# Patient Record
Sex: FEMALE | Race: WHITE | Employment: OTHER | URBAN - METROPOLITAN AREA
[De-identification: names, ages, dates, MRNs, and addresses within clinical notes are randomized per-mention and may not be internally consistent; named-entity substitution may affect disease eponyms.]

---

## 2021-06-25 ENCOUNTER — HOSPITAL ENCOUNTER (OUTPATIENT)
Age: 86
Setting detail: SPECIMEN
Discharge: HOME | End: 2021-06-25
Payer: MEDICARE

## 2021-06-25 LAB
ABSOLUTE EOS #: 0.06 K/UL (ref 0–0.44)
ABSOLUTE IMMATURE GRANULOCYTE: <0.03 K/UL (ref 0–0.3)
ABSOLUTE LYMPH #: 1.26 K/UL (ref 1.1–3.7)
ABSOLUTE MONO #: 0.39 K/UL (ref 0.1–1.2)
ANION GAP SERPL CALCULATED.3IONS-SCNC: 15 MMOL/L (ref 9–17)
BASOPHILS # BLD: 1 % (ref 0–2)
BASOPHILS ABSOLUTE: 0.04 K/UL (ref 0–0.2)
BUN BLDV-MCNC: 21 MG/DL (ref 8–23)
BUN/CREAT BLD: NORMAL (ref 9–20)
CALCIUM SERPL-MCNC: 10.1 MG/DL (ref 8.6–10.4)
CHLORIDE BLD-SCNC: 106 MMOL/L (ref 98–107)
CO2: 23 MMOL/L (ref 20–31)
CREAT SERPL-MCNC: 0.64 MG/DL (ref 0.5–0.9)
DIFFERENTIAL TYPE: ABNORMAL
EOSINOPHILS RELATIVE PERCENT: 1 % (ref 1–4)
GFR AFRICAN AMERICAN: >60 ML/MIN
GFR NON-AFRICAN AMERICAN: >60 ML/MIN
GFR SERPL CREATININE-BSD FRML MDRD: NORMAL ML/MIN/{1.73_M2}
GFR SERPL CREATININE-BSD FRML MDRD: NORMAL ML/MIN/{1.73_M2}
GLUCOSE BLD-MCNC: 83 MG/DL (ref 70–99)
HCT VFR BLD CALC: 41.6 % (ref 36.3–47.1)
HEMOGLOBIN: 12.8 G/DL (ref 11.9–15.1)
IMMATURE GRANULOCYTES: 0 %
LYMPHOCYTES # BLD: 19 % (ref 24–43)
MCH RBC QN AUTO: 30.7 PG (ref 25.2–33.5)
MCHC RBC AUTO-ENTMCNC: 30.8 G/DL (ref 28.4–34.8)
MCV RBC AUTO: 99.8 FL (ref 82.6–102.9)
MONOCYTES # BLD: 6 % (ref 3–12)
NRBC AUTOMATED: 0 PER 100 WBC
PDW BLD-RTO: 13.3 % (ref 11.8–14.4)
PHOSPHORUS: 3 MG/DL (ref 2.6–4.5)
PLATELET # BLD: 263 K/UL (ref 138–453)
PLATELET ESTIMATE: ABNORMAL
PMV BLD AUTO: 10.1 FL (ref 8.1–13.5)
POTASSIUM SERPL-SCNC: 4.2 MMOL/L (ref 3.7–5.3)
PTH INTACT: 97.42 PG/ML (ref 15–65)
RBC # BLD: 4.17 M/UL (ref 3.95–5.11)
RBC # BLD: ABNORMAL 10*6/UL
SEG NEUTROPHILS: 73 % (ref 36–65)
SEGMENTED NEUTROPHILS ABSOLUTE COUNT: 4.79 K/UL (ref 1.5–8.1)
SODIUM BLD-SCNC: 144 MMOL/L (ref 135–144)
WBC # BLD: 6.6 K/UL (ref 3.5–11.3)
WBC # BLD: ABNORMAL 10*3/UL

## 2021-06-26 LAB
ESTIMATED AVERAGE GLUCOSE: 105 MG/DL
HBA1C MFR BLD: 5.3 % (ref 4–6)
VITAMIN D 25-HYDROXY: 52.4 NG/ML (ref 30–100)

## 2022-02-01 ENCOUNTER — HOSPITAL ENCOUNTER (OUTPATIENT)
Age: 87
Setting detail: SPECIMEN
Discharge: HOME | End: 2022-02-01

## 2022-02-01 LAB
ALBUMIN SERPL-MCNC: 4.3 G/DL (ref 3.5–5.2)
ALBUMIN/GLOBULIN RATIO: 1.9 (ref 1–2.5)
ALP BLD-CCNC: 91 U/L (ref 35–104)
ALT SERPL-CCNC: 13 U/L (ref 5–33)
ANION GAP SERPL CALCULATED.3IONS-SCNC: 12 MMOL/L (ref 9–17)
AST SERPL-CCNC: 17 U/L
BILIRUB SERPL-MCNC: 0.44 MG/DL (ref 0.3–1.2)
BUN BLDV-MCNC: 19 MG/DL (ref 8–23)
BUN/CREAT BLD: ABNORMAL (ref 9–20)
CALCIUM SERPL-MCNC: 10.4 MG/DL (ref 8.6–10.4)
CHLORIDE BLD-SCNC: 109 MMOL/L (ref 98–107)
CO2: 27 MMOL/L (ref 20–31)
CREAT SERPL-MCNC: 0.67 MG/DL (ref 0.5–0.9)
GFR AFRICAN AMERICAN: >60 ML/MIN
GFR NON-AFRICAN AMERICAN: >60 ML/MIN
GFR SERPL CREATININE-BSD FRML MDRD: ABNORMAL ML/MIN/{1.73_M2}
GFR SERPL CREATININE-BSD FRML MDRD: ABNORMAL ML/MIN/{1.73_M2}
GLUCOSE FASTING: 76 MG/DL (ref 70–99)
PHOSPHORUS: 3.1 MG/DL (ref 2.6–4.5)
POTASSIUM SERPL-SCNC: 4 MMOL/L (ref 3.7–5.3)
PTH INTACT: 84.81 PG/ML (ref 15–65)
SODIUM BLD-SCNC: 148 MMOL/L (ref 135–144)
TOTAL PROTEIN: 6.6 G/DL (ref 6.4–8.3)

## 2023-05-18 ENCOUNTER — APPOINTMENT (OUTPATIENT)
Dept: CT IMAGING | Age: 88
End: 2023-05-18
Payer: MEDICARE

## 2023-05-18 ENCOUNTER — ANCILLARY PROCEDURE (OUTPATIENT)
Dept: EMERGENCY DEPT | Age: 88
End: 2023-05-18
Payer: MEDICARE

## 2023-05-18 ENCOUNTER — HOSPITAL ENCOUNTER (EMERGENCY)
Age: 88
Discharge: HOME | End: 2023-05-19
Attending: EMERGENCY MEDICINE
Payer: MEDICARE

## 2023-05-18 DIAGNOSIS — R22.1 NECK SWELLING: ICD-10-CM

## 2023-05-18 DIAGNOSIS — N30.00 ACUTE CYSTITIS WITHOUT HEMATURIA: Primary | ICD-10-CM

## 2023-05-18 LAB
ANION GAP SERPL CALCULATED.3IONS-SCNC: 8 MMOL/L (ref 9–17)
BASOPHILS # BLD: 0.04 K/UL (ref 0–0.2)
BASOPHILS NFR BLD: 1 % (ref 0–2)
BUN SERPL-MCNC: 23 MG/DL (ref 8–23)
CALCIUM SERPL-MCNC: 10.4 MG/DL (ref 8.6–10.4)
CHLORIDE SERPL-SCNC: 108 MMOL/L (ref 98–107)
CO2 SERPL-SCNC: 29 MMOL/L (ref 20–31)
CREAT SERPL-MCNC: 0.96 MG/DL (ref 0.5–0.9)
EOSINOPHIL # BLD: 0.11 K/UL (ref 0–0.44)
EOSINOPHILS RELATIVE PERCENT: 1 % (ref 1–4)
ERYTHROCYTE [DISTWIDTH] IN BLOOD BY AUTOMATED COUNT: 13.1 % (ref 11.8–14.4)
GFR SERPL CREATININE-BSD FRML MDRD: 57 ML/MIN/1.73M2
GLUCOSE SERPL-MCNC: 108 MG/DL (ref 70–99)
HCT VFR BLD AUTO: 39.9 % (ref 36.3–47.1)
HGB BLD-MCNC: 12.9 G/DL (ref 11.9–15.1)
IMM GRANULOCYTES # BLD AUTO: 0.03 K/UL (ref 0–0.3)
IMM GRANULOCYTES NFR BLD: 0 %
INR PPP: 1
LYMPHOCYTES # BLD: 21 % (ref 24–43)
LYMPHOCYTES NFR BLD: 1.68 K/UL (ref 1.1–3.7)
MCH RBC QN AUTO: 31.2 PG (ref 25.2–33.5)
MCHC RBC AUTO-ENTMCNC: 32.3 G/DL (ref 28.4–34.8)
MCV RBC AUTO: 96.6 FL (ref 82.6–102.9)
MONOCYTES NFR BLD: 0.72 K/UL (ref 0.1–1.2)
MONOCYTES NFR BLD: 9 % (ref 3–12)
NEUTROPHILS NFR BLD: 68 % (ref 36–65)
NEUTS SEG NFR BLD: 5.53 K/UL (ref 1.5–8.1)
NRBC AUTOMATED: 0 PER 100 WBC
PARTIAL THROMBOPLASTIN TIME: 25.6 SEC (ref 23–36.5)
PLATELET # BLD AUTO: 269 K/UL (ref 138–453)
PMV BLD AUTO: 9.6 FL (ref 8.1–13.5)
POTASSIUM SERPL-SCNC: 4.2 MMOL/L (ref 3.7–5.3)
PROTHROMBIN TIME: 12.8 SEC (ref 11.7–14.9)
RBC # BLD AUTO: 4.13 M/UL (ref 3.95–5.11)
SODIUM SERPL-SCNC: 145 MMOL/L (ref 135–144)
WBC OTHER # BLD: 8.1 K/UL (ref 3.5–11.3)

## 2023-05-18 PROCEDURE — 85025 COMPLETE CBC W/AUTO DIFF WBC: CPT

## 2023-05-18 PROCEDURE — 76882 US LMTD JT/FCL EVL NVASC XTR: CPT

## 2023-05-18 PROCEDURE — 85610 PROTHROMBIN TIME: CPT

## 2023-05-18 PROCEDURE — 70498 CT ANGIOGRAPHY NECK: CPT

## 2023-05-18 PROCEDURE — 99285 EMERGENCY DEPT VISIT HI MDM: CPT

## 2023-05-18 PROCEDURE — 85730 THROMBOPLASTIN TIME PARTIAL: CPT

## 2023-05-18 PROCEDURE — 6360000004 HC RX CONTRAST MEDICATION: Performed by: STUDENT IN AN ORGANIZED HEALTH CARE EDUCATION/TRAINING PROGRAM

## 2023-05-18 PROCEDURE — 70450 CT HEAD/BRAIN W/O DYE: CPT

## 2023-05-18 PROCEDURE — 80048 BASIC METABOLIC PNL TOTAL CA: CPT

## 2023-05-18 RX ORDER — OXYBUTYNIN CHLORIDE 5 MG/1
1 TABLET, EXTENDED RELEASE ORAL DAILY
COMMUNITY
Start: 2021-12-14

## 2023-05-18 RX ORDER — RAMIPRIL 5 MG/1
1 CAPSULE ORAL DAILY
COMMUNITY
Start: 2020-07-03

## 2023-05-18 RX ORDER — DONEPEZIL HYDROCHLORIDE 10 MG/1
10 TABLET, FILM COATED ORAL DAILY
COMMUNITY
Start: 2021-12-14

## 2023-05-18 RX ADMIN — IOPAMIDOL 90 ML: 755 INJECTION, SOLUTION INTRAVENOUS at 23:45

## 2023-05-18 RX ADMIN — IOPAMIDOL 75 ML: 755 INJECTION, SOLUTION INTRAVENOUS at 23:54

## 2023-05-19 VITALS
DIASTOLIC BLOOD PRESSURE: 92 MMHG | HEART RATE: 77 BPM | OXYGEN SATURATION: 99 % | RESPIRATION RATE: 14 BRPM | TEMPERATURE: 98.2 F | WEIGHT: 125 LBS | HEIGHT: 58 IN | SYSTOLIC BLOOD PRESSURE: 167 MMHG | BODY MASS INDEX: 26.24 KG/M2

## 2023-05-19 LAB
BILIRUB UR QL STRIP: NEGATIVE
CASTS #/AREA URNS LPF: NORMAL /LPF (ref 0–8)
CLARITY UR: CLEAR
COLOR UR: YELLOW
EPI CELLS #/AREA URNS HPF: NORMAL /HPF (ref 0–5)
GLUCOSE UR STRIP-MCNC: NEGATIVE MG/DL
HGB UR QL STRIP.AUTO: ABNORMAL
KETONES UR STRIP-MCNC: NEGATIVE MG/DL
LEUKOCYTE ESTERASE UR QL STRIP: ABNORMAL
NITRITE UR QL STRIP: NEGATIVE
PH UR STRIP: 7.5 [PH] (ref 5–8)
PROT UR STRIP-MCNC: ABNORMAL MG/DL
RBC #/AREA URNS HPF: NORMAL /HPF (ref 0–4)
SP GR UR STRIP: 1.06 (ref 1–1.03)
UROBILINOGEN UR STRIP-ACNC: NORMAL
WBC #/AREA URNS HPF: NORMAL /HPF (ref 0–5)

## 2023-05-19 PROCEDURE — 81001 URINALYSIS AUTO W/SCOPE: CPT

## 2023-05-19 RX ORDER — CEPHALEXIN 500 MG/1
500 CAPSULE ORAL 2 TIMES DAILY
Qty: 14 CAPSULE | Refills: 0 | Status: SHIPPED | OUTPATIENT
Start: 2023-05-19 | End: 2023-05-19 | Stop reason: SDUPTHER

## 2023-05-19 RX ORDER — CEPHALEXIN 500 MG/1
500 CAPSULE ORAL 2 TIMES DAILY
Qty: 14 CAPSULE | Refills: 0 | Status: SHIPPED | OUTPATIENT
Start: 2023-05-19 | End: 2023-05-26

## 2023-05-19 ASSESSMENT — ENCOUNTER SYMPTOMS
TROUBLE SWALLOWING: 0
VOICE CHANGE: 0
ABDOMINAL PAIN: 0
VOMITING: 0
SHORTNESS OF BREATH: 0
COLOR CHANGE: 0
NAUSEA: 0

## 2023-05-19 NOTE — ED NOTES
Pt brought to ED by her children with c/o R sided neck swelling  Pt states that she has a buldge in her neck that is pulsating  Pts daughters state that it started tonight  Pt states she has no pain, or headache  Pt denies having any chest pain or SOB  Pt resting in bed, call light given to patient white board updated      Sarah Boucher RN  05/18/23 1095

## 2023-05-19 NOTE — ED NOTES
The following labs were labeled with appropriate pt sticker and tubed to lab:     [] Blue     [] Lavender   [] on ice  [] Green/yellow  [] Green/black [] on ice  [] Pushpa Geoff  [] on ice  [] Yellow  [] Red  [] Type/ Screen  [] ABG  [] VBG    [] COVID-19 swab    [] Rapid  [] PCR  [] Flu swab  [] Peds Viral Panel     [x] Urine Sample  [] Fecal Sample  [] Pelvic Cultures  [] Blood Cultures  [] X 2  [] STREP Cultures      Juan Antonio Metz, JATINDER  05/19/23 0141

## 2023-05-19 NOTE — DISCHARGE INSTRUCTIONS
Follow up with your primary care provider in the next 1-2 days. Your CT scans of your neck showed no dilation of the arteries, but you do have calcifications in the arteries. We discussed with vascular surgery who state this is a normal finding and does not require additional follow up. Your urine shows a mild urinary tract infection. Take your medication as indicated and prescribed. Make sure you get the prescription filled and take the antibiotics until finished. Drink plenty of water while taking the antibiotics. Avoid drinking alcohol or drinks that have caffeine in it while taking antibiotics. For pain use acetaminophen (Tylenol) or ibuprofen (Motrin / Advil), unless prescribed medications that have acetaminophen or ibuprofen (or similar medications) in it. You can take over the counter acetaminophen tablets (1 - 2 tablets of the 500-mg strength every 6 hours) or ibuprofen tablets (2 tablets every 4 hours). PLEASE RETURN TO THE EMERGENCY DEPARTMENT IMMEDIATELY for worsening symptoms, inability to urinate, worsening of blood in your urine, or if you develop any concerning symptoms such as: high fever not relieved by acetaminophen (Tylenol) and/or ibuprofen (Motrin / Advil), chills, shortness of breath, chest pain, feeling of your heart fluttering or racing, persistent nausea and/or vomiting, vomiting up blood, blood in your stool, loss of consciousness, numbness, weakness or tingling in the arms or legs or change in color of the extremities, changes in mental status, persistent headache, blurry vision, loss of bladder / bowel. DISCHARGE

## 2023-05-19 NOTE — ED NOTES
Dr. Laurie Disla @ bedside with portable US to evaluate patient      Toñito Ebcarlene, RN  05/19/23 0021

## 2023-05-19 NOTE — ED NOTES
The following labs were labeled with appropriate pt sticker and tubed to lab:     [x] Blue     [x] Lavender   [] on ice  [x] Green/yellow  [] Green/black [] on ice  [] Andreina Blood  [] on ice  [] Yellow  [] Red  [] Type/ Screen  [] ABG  [] VBG    [] COVID-19 swab    [] Rapid  [] PCR  [] Flu swab  [] Peds Viral Panel     [] Urine Sample  [] Fecal Sample  [] Pelvic Cultures  [] Blood Cultures  [] X 2  [] STREP Cultures       Ramos Oden RN  05/18/23 7071

## 2023-05-19 NOTE — ED NOTES
Pts daughter concerned that patients urine has a strong odor to it, they are requesting a UA    Spoke with Dr Mitali Vogel about concerns, order placed.     Pt ambulated to restroom with assistance of daughters      Sarah Boucher, [facility]  05/19/23 7193

## 2023-05-19 NOTE — ED PROVIDER NOTES
[facility]     Emergency Department     Faculty Note/ Attestation      Pt Name: Lizz Rodriguez                                       MRN: [id number]  Kaye 1934  Date of evaluation: 5/18/2023  Note Started: 11:30 PM EDT    Patients PCP:    Raphael Spivey MD    Attestation  I performed a history and physical examination of the patient and discussed management with the resident. I reviewed the residents note and agree with the documented findings and plan of care. Any areas of disagreement are noted on the chart. I was personally present for the key portions of any procedures. I have documented in the chart those procedures where I was not present during the key portions. I have reviewed the emergency nurses triage note. I agree with the chief complaint, past medical history, past surgical history, allergies, medications, social and family history as documented unless otherwise noted below. For Physician Assistant/ Nurse Practitioner cases/documentation I have personally evaluated this patient and have completed at least one if not all key elements of the E/M (history, physical exam, and MDM). Additional findings are as noted. Initial Screens:        Arlene Coma Scale  Eye Opening: Spontaneous  Best Verbal Response: Oriented  Best Motor Response: Obeys commands  Arlene Coma Scale Score: 15    Vitals:    Vitals:    05/18/23 2248 05/18/23 2257 05/18/23 2300 05/18/23 2309   BP:   (!) 155/80    Pulse:    77   Resp:    14   Temp:       TempSrc:       SpO2: 95%  98%    Weight:  125 lb (56.7 kg)     Height:  4' 10\" (1.473 m)         CHIEF COMPLAINT       Chief Complaint   Patient presents with    Neck Swelling     Pt c/o having r sided neck swelling that is pulsating that started tonight      Patient is an 80-year-old female slightly demented lives with family who is here in providing history. Patient noted some neck swelling developed today with a pulsation in it.
Faculty Sign-Out Attestation  Handoff taken on the following patient from prior Attending Physician: Creta Buerger    I was available and discussed any additional care issues that arose and coordinated the management plans with the resident(s) caring for the patient during my duty period. Any areas of disagreement with residents documentation of care or procedures are noted on the chart. I was personally present for the key portions of any/all procedures during my duty period. I have documented in the chart those procedures where I was not present during the key portions.     Neck complaint, ct / cta pending,   Probable vascular consult,     Ct stable, vascular cleared pt for discharge home,   Pt talkative / ambulatory, vss, treating cystitis,   discharged     Cindi Conrad, DO  05/19/23 [address] [facility], DO  05/19/23 0107
[address] [facility]  Emergency Department  Emergency Medicine Resident Turn-Over     Note Started: 12:20 AM EDT    Care of [facility] was assumed from Dr. Carl Ames and is being seen for Neck Swelling (Pt c/o having r sided neck swelling that is pulsating that started tonight )  . The patient's initial evaluation and plan have been discussed with the prior provider who initially evaluated the patient.      EMERGENCY DEPARTMENT COURSE / MEDICAL DECISION MAKING:       MEDICATIONS GIVEN:  Orders Placed This Encounter   Medications    iopamidol (ISOVUE-370) 76 % injection 90 mL    iopamidol (ISOVUE-370) 76 % injection 75 mL    DISCONTD: cephALEXin (KEFLEX) 500 MG capsule     Sig: Take 1 capsule by mouth 2 times daily for 7 days     Dispense:  14 capsule     Refill:  0    cephALEXin (KEFLEX) 500 MG capsule     Sig: Take 1 capsule by mouth 2 times daily for 7 days     Dispense:  14 capsule     Refill:  0       LABS / RADIOLOGY:     Labs Reviewed   CBC WITH AUTO DIFFERENTIAL - Abnormal; Notable for the following components:       Result Value    Seg Neutrophils 68 (*)     Lymphocytes 21 (*)     All other components within normal limits   BASIC METABOLIC PANEL - Abnormal; Notable for the following components:    Glucose 108 (*)     Creatinine 0.96 (*)     Est, Glom Filt Rate 57 (*)     Sodium 145 (*)     Chloride 108 (*)     Anion Gap 8 (*)     All other components within normal limits   URINALYSIS WITH REFLEX TO CULTURE - Abnormal; Notable for the following components:    Specific Gravity, UA 1.057 (*)     Urine Hgb TRACE (*)     Protein, UA NEGATIVE  Verified by sulfosalicylic acid test. (*)     Leukocyte Esterase, Urine TRACE (*)     All other components within normal limits   APTT   PROTIME-INR   MICROSCOPIC URINALYSIS       POC US EXTREMITY NON VASC LIMITED    Result Date: 5/18/2023  POCUS_Soft_Tissue     Exam Information:          Exam type:  Clinically indicated     Indication(s) for Exam:
region, ultrasound demonstrates a larger carotid artery on the right compared to the left  Cardiovascular:      Rate and Rhythm: Normal rate and regular rhythm. Pulses: Normal pulses. Pulmonary:      Effort: Pulmonary effort is normal. No respiratory distress. Breath sounds: Normal breath sounds. No wheezing. Abdominal:      General: Abdomen is flat. Palpations: Abdomen is soft. Tenderness: There is no abdominal tenderness. There is no guarding or rebound. Skin:     General: Skin is warm and dry. Neurological:      Mental Status: She is alert. Comments: Cranial nerves II-XII are equal and grossly intact with pupils equal and reactive with bilateral pupillary reflexes intact, no visual field deficits, intact extraocular motion, no facial motor deficit or droop, no facial sensory deficit, symmetrical palate elevation, intact tongue range of motion, good shoulder shrug and good neck range of motion,  no pronator drift, good hand grasp bilaterally, 5/5 strength in the bilateral upper and lower extremity, no sensory deficit in the upper or lower extremity. DDX/DIAGNOSTIC RESULTS / EMERGENCY DEPARTMENT COURSE / MDM     Medical Decision Making  80-year-old female presents with a mass to her right neck. Denies recent trauma or injury and no neurological deficits. On exam she is well-appearing nontoxic vitals are within normal's. Examination unremarkable. No neurological deficits. Ultrasound demonstrates concern for larger carotid artery on the right versus the left concerning for aneurysm or possibly dissection. Will obtain CT head and CT angiogram, CBC BMP PTT and INR. Amount and/or Complexity of Data Reviewed  Labs: ordered. Radiology: ordered. Risk  Prescription drug management.           EMERGENCY DEPARTMENT COURSE:    Signed out to oncoming resident awaiting CT and CTA results    ED Course as of 05/21/23 1959   Fri May 19, 2023   0020 Signout received from

## 2023-05-19 NOTE — CONSULTS
Division of Vascular Surgery        New Consult      Physician Requesting Consult:  Dr. José Miguel Sher    Reason for Consult:  Pulsatile neck mass    Chief Complaint:      Pulsating in R side of neck    History of Present Illness:      Awilda Narvaez is a 80 y.o. woman who presents with concern regarding pulsatile mass that she noticed in the right side of her neck. Pt is accompanied by her two daughters. Pt states that she first noticed the pulsing sensation when she rubbed her neck after gardening yesterday afternoon. Pt denies any other associated symptoms. Pt denies any recent trauma to the area. Denies pain, lightheadness, weakness, difficulty speaking, numbness, tingling, facial droop, fever, chills, nausea, and vomiting. Pt's daughters encouraged her to seek further evaluation after noting visual pulsation in pt's right neck. Pt has medical history of hypertension, dementia,  and neurogenic bladder. Pt and daughters deny any prior vascular history. CTA of the head and neck was performed upon presentation which revealed  a tortuous superficial course of innominate artery, kinking of the left internal carotid artery causing 50% stenosis, and no evidence of dissection or aneurysm. Medical History:     History reviewed. No pertinent past medical history. Surgical History:     History reviewed. No pertinent surgical history. Family History:     History reviewed. No pertinent family history. Allergies:       Patient has no known allergies. Medications:      No current facility-administered medications for this encounter.      Current Outpatient Medications   Medication Sig Dispense Refill    ramipril (ALTACE) 5 MG capsule Take 1 capsule by mouth daily      oxybutynin (DITROPAN-XL) 5 MG extended release tablet Take 1 tablet by mouth daily      donepezil (ARICEPT) 10 MG tablet Take 1 tablet by mouth daily      Cholecalciferol 50 MCG (2000 UT) CAPS Vitamin D3 50 mcg (2,000 unit) capsule   Take by oral Please call.  We will send in pantoprazole instead of the omeprazole and see if that helps better.  Be sure she takes on an empty stomach at least 30 minutes before eating.  I will send this to [facility] pharmacy.  Will just try 30 day supply first to see if helps.

## 2023-11-23 ENCOUNTER — ANCILLARY PROCEDURE (OUTPATIENT)
Dept: EMERGENCY DEPT | Age: 88
End: 2023-11-23
Attending: EMERGENCY MEDICINE
Payer: MEDICARE

## 2023-11-23 ENCOUNTER — APPOINTMENT (OUTPATIENT)
Dept: GENERAL RADIOLOGY | Age: 88
End: 2023-11-23
Payer: MEDICARE

## 2023-11-23 ENCOUNTER — APPOINTMENT (OUTPATIENT)
Dept: CT IMAGING | Age: 88
End: 2023-11-23
Payer: MEDICARE

## 2023-11-23 ENCOUNTER — HOSPITAL ENCOUNTER (INPATIENT)
Age: 88
LOS: 1 days | Discharge: HOME | End: 2023-11-25
Attending: EMERGENCY MEDICINE | Admitting: INTERNAL MEDICINE
Payer: MEDICARE

## 2023-11-23 DIAGNOSIS — R55 SYNCOPE, UNSPECIFIED SYNCOPE TYPE: Primary | ICD-10-CM

## 2023-11-23 LAB
ALBUMIN SERPL-MCNC: 3.8 G/DL (ref 3.5–5.2)
ALBUMIN/GLOB SERPL: 1.5 {RATIO} (ref 1–2.5)
ALP SERPL-CCNC: 94 U/L (ref 35–104)
ALT SERPL-CCNC: 9 U/L (ref 5–33)
ANION GAP SERPL CALCULATED.3IONS-SCNC: 11 MMOL/L (ref 9–17)
AST SERPL-CCNC: 19 U/L
BASOPHILS # BLD: 0.03 K/UL (ref 0–0.2)
BASOPHILS NFR BLD: 1 % (ref 0–2)
BILIRUB SERPL-MCNC: 0.3 MG/DL (ref 0.3–1.2)
BILIRUB UR QL STRIP: NEGATIVE
BNP SERPL-MCNC: 221 PG/ML
BUN BLD-MCNC: 18 MG/DL (ref 8–26)
BUN SERPL-MCNC: 16 MG/DL (ref 8–23)
CA-I BLD-SCNC: 1.36 MMOL/L (ref 1.15–1.33)
CALCIUM SERPL-MCNC: 10.1 MG/DL (ref 8.6–10.4)
CASTS #/AREA URNS LPF: ABNORMAL /LPF (ref 0–2)
CASTS #/AREA URNS LPF: ABNORMAL /LPF (ref 0–2)
CHLORIDE BLD-SCNC: 105 MMOL/L (ref 98–107)
CHLORIDE SERPL-SCNC: 105 MMOL/L (ref 98–107)
CLARITY UR: ABNORMAL
CO2 BLD CALC-SCNC: 32 MMOL/L (ref 22–30)
CO2 SERPL-SCNC: 28 MMOL/L (ref 20–31)
COLOR UR: YELLOW
CREAT SERPL-MCNC: 1.1 MG/DL (ref 0.5–0.9)
EGFR, POC: >60 ML/MIN/1.73M2
EOSINOPHIL # BLD: 0.08 K/UL (ref 0–0.44)
EOSINOPHILS RELATIVE PERCENT: 1 % (ref 1–4)
EPI CELLS #/AREA URNS HPF: ABNORMAL /HPF (ref 0–5)
ERYTHROCYTE [DISTWIDTH] IN BLOOD BY AUTOMATED COUNT: 13.2 % (ref 11.8–14.4)
GFR SERPL CREATININE-BSD FRML MDRD: 48 ML/MIN/1.73M2
GLUCOSE BLD-MCNC: 148 MG/DL (ref 74–100)
GLUCOSE SERPL-MCNC: 144 MG/DL (ref 70–99)
GLUCOSE UR STRIP-MCNC: NEGATIVE MG/DL
HCO3 VENOUS: 31.5 MMOL/L (ref 22–29)
HCT VFR BLD AUTO: 38 % (ref 36–46)
HCT VFR BLD AUTO: 40.5 % (ref 36.3–47.1)
HGB BLD-MCNC: 13.2 G/DL (ref 11.9–15.1)
HGB UR QL STRIP.AUTO: ABNORMAL
IMM GRANULOCYTES # BLD AUTO: 0.03 K/UL (ref 0–0.3)
IMM GRANULOCYTES NFR BLD: 1 %
KETONES UR STRIP-MCNC: NEGATIVE MG/DL
LEUKOCYTE ESTERASE UR QL STRIP: ABNORMAL
LIPASE SERPL-CCNC: 39 U/L (ref 13–60)
LYMPHOCYTES NFR BLD: 1.19 K/UL (ref 1.1–3.7)
LYMPHOCYTES RELATIVE PERCENT: 20 % (ref 24–43)
MCH RBC QN AUTO: 31.6 PG (ref 25.2–33.5)
MCHC RBC AUTO-ENTMCNC: 32.6 G/DL (ref 28.4–34.8)
MCV RBC AUTO: 96.9 FL (ref 82.6–102.9)
MONOCYTES NFR BLD: 0.41 K/UL (ref 0.1–1.2)
MONOCYTES NFR BLD: 7 % (ref 3–12)
NEUTROPHILS NFR BLD: 70 % (ref 36–65)
NEUTS SEG NFR BLD: 4.16 K/UL (ref 1.5–8.1)
NITRITE UR QL STRIP: NEGATIVE
NRBC BLD-RTO: 0 PER 100 WBC
O2 SAT, VEN: 17.7 % (ref 60–85)
PCO2, VEN: 53 MM HG (ref 41–51)
PH UR STRIP: 8 [PH] (ref 5–8)
PH VENOUS: 7.38 (ref 7.32–7.43)
PLATELET # BLD AUTO: 225 K/UL (ref 138–453)
PMV BLD AUTO: 9.5 FL (ref 8.1–13.5)
PO2, VEN: 14.8 MM HG (ref 30–50)
POC ANION GAP: 8 MMOL/L (ref 7–16)
POC CREATININE: 0.8 MG/DL (ref 0.51–1.19)
POC HEMOGLOBIN (CALC): 12.8 G/DL (ref 12–16)
POC LACTIC ACID: 1.2 MMOL/L (ref 0.56–1.39)
POSITIVE BASE EXCESS, VEN: 5.1 MMOL/L (ref 0–3)
POTASSIUM BLD-SCNC: 4 MMOL/L (ref 3.5–4.5)
POTASSIUM SERPL-SCNC: 4.1 MMOL/L (ref 3.7–5.3)
PROT SERPL-MCNC: 6.4 G/DL (ref 6.4–8.3)
PROT UR STRIP-MCNC: NEGATIVE MG/DL
RBC # BLD AUTO: 4.18 M/UL (ref 3.95–5.11)
RBC #/AREA URNS HPF: ABNORMAL /HPF (ref 0–2)
SODIUM BLD-SCNC: 144 MMOL/L (ref 138–146)
SODIUM SERPL-SCNC: 144 MMOL/L (ref 135–144)
SP GR UR STRIP: 1.04 (ref 1–1.03)
TROPONIN I SERPL HS-MCNC: 9 NG/L (ref 0–14)
TROPONIN I SERPL HS-MCNC: 9 NG/L (ref 0–14)
UROBILINOGEN UR STRIP-ACNC: NORMAL EU/DL (ref 0–1)
WBC #/AREA URNS HPF: ABNORMAL /HPF (ref 0–5)
WBC OTHER # BLD: 5.9 K/UL (ref 3.5–11.3)

## 2023-11-23 PROCEDURE — 83605 ASSAY OF LACTIC ACID: CPT

## 2023-11-23 PROCEDURE — 84520 ASSAY OF UREA NITROGEN: CPT

## 2023-11-23 PROCEDURE — 71045 X-RAY EXAM CHEST 1 VIEW: CPT

## 2023-11-23 PROCEDURE — 80051 ELECTROLYTE PANEL: CPT

## 2023-11-23 PROCEDURE — 2580000003 HC RX 258: Performed by: STUDENT IN AN ORGANIZED HEALTH CARE EDUCATION/TRAINING PROGRAM

## 2023-11-23 PROCEDURE — 82565 ASSAY OF CREATININE: CPT

## 2023-11-23 PROCEDURE — 83880 ASSAY OF NATRIURETIC PEPTIDE: CPT

## 2023-11-23 PROCEDURE — 85025 COMPLETE CBC W/AUTO DIFF WBC: CPT

## 2023-11-23 PROCEDURE — 99285 EMERGENCY DEPT VISIT HI MDM: CPT

## 2023-11-23 PROCEDURE — 84484 ASSAY OF TROPONIN QUANT: CPT

## 2023-11-23 PROCEDURE — 93976 VASCULAR STUDY: CPT

## 2023-11-23 PROCEDURE — 81001 URINALYSIS AUTO W/SCOPE: CPT

## 2023-11-23 PROCEDURE — 70450 CT HEAD/BRAIN W/O DYE: CPT

## 2023-11-23 PROCEDURE — 85014 HEMATOCRIT: CPT

## 2023-11-23 PROCEDURE — 93005 ELECTROCARDIOGRAM TRACING: CPT | Performed by: STUDENT IN AN ORGANIZED HEALTH CARE EDUCATION/TRAINING PROGRAM

## 2023-11-23 PROCEDURE — 80053 COMPREHEN METABOLIC PANEL: CPT

## 2023-11-23 PROCEDURE — 82330 ASSAY OF CALCIUM: CPT

## 2023-11-23 PROCEDURE — 83690 ASSAY OF LIPASE: CPT

## 2023-11-23 PROCEDURE — 6360000004 HC RX CONTRAST MEDICATION: Performed by: STUDENT IN AN ORGANIZED HEALTH CARE EDUCATION/TRAINING PROGRAM

## 2023-11-23 PROCEDURE — 82803 BLOOD GASES ANY COMBINATION: CPT

## 2023-11-23 PROCEDURE — 87086 URINE CULTURE/COLONY COUNT: CPT

## 2023-11-23 PROCEDURE — 74174 CTA ABD&PLVS W/CONTRAST: CPT

## 2023-11-23 PROCEDURE — 82947 ASSAY GLUCOSE BLOOD QUANT: CPT

## 2023-11-23 RX ORDER — 0.9 % SODIUM CHLORIDE 0.9 %
500 INTRAVENOUS SOLUTION INTRAVENOUS ONCE
Status: COMPLETED | OUTPATIENT
Start: 2023-11-23 | End: 2023-11-23

## 2023-11-23 RX ADMIN — IOPAMIDOL 75 ML: 755 INJECTION, SOLUTION INTRAVENOUS at 17:53

## 2023-11-23 RX ADMIN — SODIUM CHLORIDE 500 ML: 9 INJECTION, SOLUTION INTRAVENOUS at 20:05

## 2023-11-23 RX ADMIN — SODIUM CHLORIDE 500 ML: 9 INJECTION, SOLUTION INTRAVENOUS at 17:46

## 2023-11-23 ASSESSMENT — ENCOUNTER SYMPTOMS
VOMITING: 0
DIARRHEA: 0
ABDOMINAL PAIN: 0
EYE REDNESS: 0
SHORTNESS OF BREATH: 0
ABDOMINAL DISTENTION: 1
RHINORRHEA: 0
NAUSEA: 0

## 2023-11-23 NOTE — ED PROVIDER NOTES
[facility] ED  Emergency Department Encounter  Emergency Medicine Resident     Pt Name:Mable Weber  MRN: [id number]  [address] [facility] 1934  Date of evaluation: 11/23/23  PCP:  Vikki Jimenez MD  Note Started: 4:51 PM EST    CHIEF COMPLAINT       Chief Complaint   Patient presents with    Loss of Consciousness     HISTORY OF PRESENT ILLNESS  (Location/Symptom, Timing/Onset, Context/Setting, Quality, Duration, Modifying Factors, Severity.)      Bonnie Jett is a 80 y.o. female who presents via EMS for concern over syncopal episode where patient became unresponsive. Family said that she fell asleep at the dinner table which is not unusual for the patient, however this episode lasted longer than typical and she became difficult to arouse. When EMS arrived to the scene, they noted that her oxygen was at 93% on room air they did place her on 2 L nasal cannula. Blood pressure has been normotensive for EMS. They did not provide any intervention. EMS did not notice any focal neurologic deficits and did state that family mentioned that the stuttering and difficulty with word finding is normal for the patient. She does not have any history of stroke or TIA. Medications include those for Alzheimer's as well as oxybutynin. PAST MEDICAL / SURGICAL / SOCIAL / FAMILY HISTORY      has no past medical history on file. has no past surgical history on file. Social History     Socioeconomic History    Marital status:       Spouse name: Not on file    Number of children: Not on file    Years of education: Not on file    Highest education level: Not on file   Occupational History    Not on file   Tobacco Use    Smoking status: Never    Smokeless tobacco: Never   Substance and Sexual Activity    Alcohol use: Not on file    Drug use: Never    Sexual activity: Not on file   Other Topics Concern    Not on file   Social History Narrative    Not on file     Social Determinants of Health

## 2023-11-23 NOTE — ED NOTES
Pt arrived to ED via EMS [facility];  Per EMS family called after patient fell asleep before dinner. EMS states family noticed irregular breathing and states they were unable to arouse patient. EMS reports patient was slightly able to be aroused on their arrival but reports patient is now at baseline.   Patient A&O to person, and time but confused on events/place; this is baseline per EMS       Anderson Daly RN  11/23/23 7381

## 2023-11-24 ENCOUNTER — APPOINTMENT (OUTPATIENT)
Age: 88
End: 2023-11-24
Payer: MEDICARE

## 2023-11-24 PROBLEM — G30.9 ALZHEIMER'S DISEASE (HCC): Status: ACTIVE | Noted: 2023-11-24

## 2023-11-24 PROBLEM — R41.82 ALTERED MENTAL STATUS: Status: ACTIVE | Noted: 2023-11-24

## 2023-11-24 PROBLEM — R55 SYNCOPE AND COLLAPSE: Status: ACTIVE | Noted: 2023-11-24

## 2023-11-24 PROBLEM — F02.80 ALZHEIMER'S DISEASE (HCC): Status: ACTIVE | Noted: 2023-11-24

## 2023-11-24 LAB
ANION GAP SERPL CALCULATED.3IONS-SCNC: 8 MMOL/L (ref 9–17)
BUN SERPL-MCNC: 13 MG/DL (ref 8–23)
CALCIUM SERPL-MCNC: 9.7 MG/DL (ref 8.6–10.4)
CHLORIDE SERPL-SCNC: 108 MMOL/L (ref 98–107)
CO2 SERPL-SCNC: 25 MMOL/L (ref 20–31)
CREAT SERPL-MCNC: 0.8 MG/DL (ref 0.5–0.9)
EKG ATRIAL RATE: 66 BPM
EKG P AXIS: 30 DEGREES
EKG P-R INTERVAL: 188 MS
EKG Q-T INTERVAL: 428 MS
EKG QRS DURATION: 96 MS
EKG QTC CALCULATION (BAZETT): 448 MS
EKG R AXIS: -16 DEGREES
EKG T AXIS: -4 DEGREES
EKG VENTRICULAR RATE: 66 BPM
FOLATE SERPL-MCNC: >20 NG/ML
GFR SERPL CREATININE-BSD FRML MDRD: >60 ML/MIN/1.73M2
GLUCOSE BLD-MCNC: 88 MG/DL (ref 65–105)
GLUCOSE SERPL-MCNC: 82 MG/DL (ref 70–99)
MICROORGANISM SPEC CULT: NORMAL
POTASSIUM SERPL-SCNC: 3.6 MMOL/L (ref 3.7–5.3)
SODIUM SERPL-SCNC: 141 MMOL/L (ref 135–144)
SPECIMEN DESCRIPTION: NORMAL
TSH SERPL DL<=0.05 MIU/L-ACNC: 1.35 UIU/ML (ref 0.3–5)
VIT B12 SERPL-MCNC: 503 PG/ML (ref 232–1245)

## 2023-11-24 PROCEDURE — 2060000000 HC ICU INTERMEDIATE R&B

## 2023-11-24 PROCEDURE — 82607 VITAMIN B-12: CPT

## 2023-11-24 PROCEDURE — 84443 ASSAY THYROID STIM HORMONE: CPT

## 2023-11-24 PROCEDURE — 95819 EEG AWAKE AND ASLEEP: CPT | Performed by: PSYCHIATRY & NEUROLOGY

## 2023-11-24 PROCEDURE — 80048 BASIC METABOLIC PNL TOTAL CA: CPT

## 2023-11-24 PROCEDURE — 82746 ASSAY OF FOLIC ACID SERUM: CPT

## 2023-11-24 PROCEDURE — 6370000000 HC RX 637 (ALT 250 FOR IP): Performed by: STUDENT IN AN ORGANIZED HEALTH CARE EDUCATION/TRAINING PROGRAM

## 2023-11-24 PROCEDURE — 36415 COLL VENOUS BLD VENIPUNCTURE: CPT

## 2023-11-24 PROCEDURE — 95819 EEG AWAKE AND ASLEEP: CPT

## 2023-11-24 PROCEDURE — 6370000000 HC RX 637 (ALT 250 FOR IP)

## 2023-11-24 PROCEDURE — 93306 TTE W/DOPPLER COMPLETE: CPT | Performed by: INTERNAL MEDICINE

## 2023-11-24 PROCEDURE — 82947 ASSAY GLUCOSE BLOOD QUANT: CPT

## 2023-11-24 PROCEDURE — 93010 ELECTROCARDIOGRAM REPORT: CPT | Performed by: INTERNAL MEDICINE

## 2023-11-24 PROCEDURE — 2580000003 HC RX 258

## 2023-11-24 PROCEDURE — 99222 1ST HOSP IP/OBS MODERATE 55: CPT | Performed by: INTERNAL MEDICINE

## 2023-11-24 PROCEDURE — 6360000002 HC RX W HCPCS

## 2023-11-24 PROCEDURE — 99223 1ST HOSP IP/OBS HIGH 75: CPT | Performed by: PSYCHIATRY & NEUROLOGY

## 2023-11-24 PROCEDURE — 93306 TTE W/DOPPLER COMPLETE: CPT

## 2023-11-24 RX ORDER — OXYBUTYNIN CHLORIDE 5 MG/1
5 TABLET, EXTENDED RELEASE ORAL DAILY
Status: DISCONTINUED | OUTPATIENT
Start: 2023-11-24 | End: 2023-11-25 | Stop reason: HOSPADM

## 2023-11-24 RX ORDER — ACETAMINOPHEN 650 MG/1
650 SUPPOSITORY RECTAL EVERY 6 HOURS PRN
Status: DISCONTINUED | OUTPATIENT
Start: 2023-11-24 | End: 2023-11-25 | Stop reason: HOSPADM

## 2023-11-24 RX ORDER — POTASSIUM CHLORIDE 20 MEQ/1
40 TABLET, EXTENDED RELEASE ORAL PRN
Status: DISCONTINUED | OUTPATIENT
Start: 2023-11-24 | End: 2023-11-25 | Stop reason: HOSPADM

## 2023-11-24 RX ORDER — SODIUM CHLORIDE 9 MG/ML
INJECTION, SOLUTION INTRAVENOUS CONTINUOUS
Status: DISCONTINUED | OUTPATIENT
Start: 2023-11-24 | End: 2023-11-24

## 2023-11-24 RX ORDER — LISINOPRIL 20 MG/1
20 TABLET ORAL DAILY
Status: DISCONTINUED | OUTPATIENT
Start: 2023-11-24 | End: 2023-11-25 | Stop reason: HOSPADM

## 2023-11-24 RX ORDER — ENOXAPARIN SODIUM 100 MG/ML
40 INJECTION SUBCUTANEOUS DAILY
Status: DISCONTINUED | OUTPATIENT
Start: 2023-11-24 | End: 2023-11-25 | Stop reason: HOSPADM

## 2023-11-24 RX ORDER — QUETIAPINE FUMARATE 25 MG/1
25 TABLET, FILM COATED ORAL ONCE
Status: COMPLETED | OUTPATIENT
Start: 2023-11-24 | End: 2023-11-24

## 2023-11-24 RX ORDER — ONDANSETRON 2 MG/ML
4 INJECTION INTRAMUSCULAR; INTRAVENOUS EVERY 6 HOURS PRN
Status: DISCONTINUED | OUTPATIENT
Start: 2023-11-24 | End: 2023-11-25 | Stop reason: HOSPADM

## 2023-11-24 RX ORDER — POTASSIUM CHLORIDE 7.45 MG/ML
10 INJECTION INTRAVENOUS PRN
Status: DISCONTINUED | OUTPATIENT
Start: 2023-11-24 | End: 2023-11-25 | Stop reason: HOSPADM

## 2023-11-24 RX ORDER — SODIUM CHLORIDE 0.9 % (FLUSH) 0.9 %
5-40 SYRINGE (ML) INJECTION EVERY 12 HOURS SCHEDULED
Status: DISCONTINUED | OUTPATIENT
Start: 2023-11-24 | End: 2023-11-25 | Stop reason: HOSPADM

## 2023-11-24 RX ORDER — ONDANSETRON 4 MG/1
4 TABLET, ORALLY DISINTEGRATING ORAL EVERY 8 HOURS PRN
Status: DISCONTINUED | OUTPATIENT
Start: 2023-11-24 | End: 2023-11-25 | Stop reason: HOSPADM

## 2023-11-24 RX ORDER — SODIUM CHLORIDE 0.9 % (FLUSH) 0.9 %
5-40 SYRINGE (ML) INJECTION PRN
Status: DISCONTINUED | OUTPATIENT
Start: 2023-11-24 | End: 2023-11-25 | Stop reason: HOSPADM

## 2023-11-24 RX ORDER — ACETAMINOPHEN 325 MG/1
650 TABLET ORAL EVERY 6 HOURS PRN
Status: DISCONTINUED | OUTPATIENT
Start: 2023-11-24 | End: 2023-11-25 | Stop reason: HOSPADM

## 2023-11-24 RX ORDER — LISINOPRIL 10 MG/1
20 TABLET ORAL ONCE
Status: COMPLETED | OUTPATIENT
Start: 2023-11-24 | End: 2023-11-24

## 2023-11-24 RX ORDER — POLYETHYLENE GLYCOL 3350 17 G/17G
17 POWDER, FOR SOLUTION ORAL DAILY PRN
Status: DISCONTINUED | OUTPATIENT
Start: 2023-11-24 | End: 2023-11-25 | Stop reason: HOSPADM

## 2023-11-24 RX ORDER — MAGNESIUM SULFATE IN WATER 40 MG/ML
2000 INJECTION, SOLUTION INTRAVENOUS PRN
Status: DISCONTINUED | OUTPATIENT
Start: 2023-11-24 | End: 2023-11-25 | Stop reason: HOSPADM

## 2023-11-24 RX ORDER — SODIUM CHLORIDE 9 MG/ML
INJECTION, SOLUTION INTRAVENOUS PRN
Status: DISCONTINUED | OUTPATIENT
Start: 2023-11-24 | End: 2023-11-25 | Stop reason: HOSPADM

## 2023-11-24 RX ADMIN — ENOXAPARIN SODIUM 40 MG: 100 INJECTION SUBCUTANEOUS at 08:25

## 2023-11-24 RX ADMIN — LISINOPRIL 20 MG: 20 TABLET ORAL at 08:26

## 2023-11-24 RX ADMIN — SODIUM CHLORIDE: 9 INJECTION, SOLUTION INTRAVENOUS at 02:26

## 2023-11-24 RX ADMIN — OXYBUTYNIN CHLORIDE 5 MG: 5 TABLET, EXTENDED RELEASE ORAL at 08:34

## 2023-11-24 RX ADMIN — QUETIAPINE FUMARATE 25 MG: 25 TABLET ORAL at 14:03

## 2023-11-24 RX ADMIN — POTASSIUM CHLORIDE 40 MEQ: 1500 TABLET, EXTENDED RELEASE ORAL at 14:03

## 2023-11-24 RX ADMIN — SODIUM CHLORIDE, PRESERVATIVE FREE 10 ML: 5 INJECTION INTRAVENOUS at 08:27

## 2023-11-24 RX ADMIN — LISINOPRIL 20 MG: 10 TABLET ORAL at 00:45

## 2023-11-24 RX ADMIN — SODIUM CHLORIDE, PRESERVATIVE FREE 10 ML: 5 INJECTION INTRAVENOUS at 19:43

## 2023-11-24 NOTE — CARE COORDINATION
Case Management Assessment  Initial Evaluation    Date/Time of Evaluation: 11/24/2023 8:10 AM  Assessment Completed by: Yolanda Hamilton    If patient is discharged prior to next notation, then this note serves as note for discharge by case management. Patient Name: Marisol Cho                   YOB: 1934  Diagnosis: Syncope and collapse [R55]  Syncope, unspecified syncope type [R55]                   Date / Time: 11/23/2023  4:47 PM    Patient Admission Status: Inpatient   Readmission Risk (Low < 19, Mod (19-27), High > 27): Readmission Risk Score: 8.8    Current PCP: Henrik Culver MD  PCP verified by CM? (P) Yes    Chart Reviewed: Yes      History Provided by: (P) Child/Family  Patient Orientation: (P) Other (see comment) (Has Alzheimer's)    Patient Cognition: (P) Dementia / Early Alzheimer's    Hospitalization in the last 30 days (Readmission):  Yes    If yes, Readmission Assessment in CM Navigator will be completed.     Advance Directives:      Code Status: Full Code   Patient's Primary Decision Maker is: Named in Dewayne BRIAN Olmedo  (parul flynn- daughter)      Discharge Planning:    Patient lives with: (P) Children (Lives with Son and Daughter in law) Type of Home:    Primary Care Giver: (P) Family  Patient Support Systems include: (P) Children   Current Financial resources: (P) Medicare, Other (Comment) (BCBS)  Current community resources: (P) None  Current services prior to admission: (P) None            Current DME:              Type of Home Care services:  (P) None    ADLS  Prior functional level: (P) Assistance with the following:, Cooking, Housework, Shopping, Mobility  Current functional level: (P) Assistance with the following:, Cooking, Housework, Shopping, Mobility    PT AM-PAC:   /24  OT AM-PAC:   /24    Family can provide assistance at DC: (P) Yes  Would you like Case Management to discuss the discharge plan with any other family members/significant others, and if so, who? (P) Yes (Children)  Plans to Return to Present Housing: (P) Yes  Other Identified Issues/Barriers to RETURNING to current housing: none  Potential Assistance needed at discharge:              Potential DME:    Patient expects to discharge to: (P) [address] [facility] for transportation at discharge:  [address] [facility] [address]: Caryle Curia / Plan: MEDICARE PART A AND B / Product Type: *No Product type* /     Does insurance require precert for SNF: No    Potential assistance Purchasing Medications: (P) No  Meds-to-Beds request:        Estelle Vail [address] [phone]  [address] [address]  Phone: [phone] Fax: [phone]      Notes:    Factors facilitating achievement of predicted outcomes: Family support and Pleasant    Barriers to discharge: Medical complications    Additional Case Management Notes: Met with patient/daughter for initial assessment. Daughter states her mom lives with her son and daughter in law with 24/7 care. Mom will return home with her son. No identified needs at this time. Verified emergency contact and insurance    The Plan for Transition of Care is related to the following treatment goals of Syncope and collapse [R55]  Syncope, unspecified syncope type [O42]    IF APPLICABLE: The Patient and/or patient representative Kamilla Jules and her family were provided with a choice of provider and agrees with the discharge plan. Freedom of choice list with basic dialogue that supports the patient's individualized plan of care/goals and shares the quality data associated with the providers was provided to: (P) Patient Representative   Patient Representative Name: Misha Gibbons     The Patient and/or Patient Representative Agree with the Discharge Plan?  (P) Yes    Shira Wu  Case Management Department  Ph: [phone]

## 2023-11-24 NOTE — CONSULTS
[facility] Cardiology Cardiology    Consult / H&P               Today's Date: 11/24/2023  Patient Name: Christine Yang  Date of admission: 11/23/2023  4:47 PM  Patient's age: 80 y.o., 1934  Admission Dx: Syncope and collapse [R55]  Syncope, unspecified syncope type [R55]    Reason for Consult:  Cardiac evaluation    Requesting Physician: Lyndsay Newell MD    CHIEF COMPLAINT:  AMS     History Obtained From:  patient, electronic medical record    HISTORY OF PRESENT ILLNESS:      The patient is a 80 y.o.  female past medical history of Alzheimer's dementia, urinary incontinence, hypertension who is admitted to the hospital for altered mental status. Apparently patient was sitting in dinner table when she fell asleep. Became difficult to arouse in fact became unresponsive. Patient will eventually respond to sternal rub. Episode lasted about 10 to 15 minutes. Troponins are normal. EKG apparently showed normal sinus rhythm EKG apparently showed normal sinus rhythm. Patient has never seen cardiology nor has she ever had a echo done previously. Past Medical History:   has no past medical history on file. Past Surgical History:   has no past surgical history on file. Home Medications:    Prior to Admission medications    Medication Sig Start Date End Date Taking? Authorizing Provider   ramipril (ALTACE) 5 MG capsule Take 1 capsule by mouth daily 7/3/20   Brittany Ramos MD   oxybutynin (DITROPAN-XL) 5 MG extended release tablet Take 1 tablet by mouth daily 12/14/21   Brittany Ramos MD   donepezil (ARICEPT) 10 MG tablet Take 1 tablet by mouth daily 12/14/21   Brittany Ramos MD   Cholecalciferol 50 MCG (2000 UT) CAPS Vitamin D3 50 mcg (2,000 unit) capsule   Take by oral route.     Brittany Ramos MD      Current Facility-Administered Medications: sodium chloride flush 0.9 % injection 5-40 mL, 5-40 mL, IntraVENous, 2 times per day  sodium chloride flush 0.9 % injection 5-40 mL,

## 2023-11-24 NOTE — H&P
[address] [address] [facility]     Department of Internal Medicine - Staff Internal Medicine Teaching Service          ADMISSION NOTE/HISTORY AND PHYSICAL EXAMINATION   Date: 11/24/2023  Patient Name: Abdirahman Soliman  Date of admission: 11/23/2023  4:47 PM  YOB: 1934  PCP: Ailin Dexter MD  History Obtained From:  patient , electronic medical record    CHIEF COMPLAINT     Chief complaint: AMS    HISTORY OF PRESENTING ILLNESS     The patient is a pleasant 80 y.o. female with a PMH of Alzeihmer's dementia, urinary incontience, and hypertension presents with a chief complaint of AMS. Patient was sitting at the dinner table with her family and fell asleep, which is not unusual for her, however typically patient is easy to arouse when she does. This time, patient became unresponsive, started drooling and began leaning over to 1 side. Family moved patient to the ground and patient was able to respond to sternal rub. This episode lasted about 10 to 15 minutes. On EMS arrival, patient was placed on 2 L of nasal cannula for an SPO2 of 93% on room air. Vital signs were otherwise stable. Per patient's daughter, patient appeared to be confused after the episode and repeating her own name, \"Mable. \"  Denies tongue biting, lip smacking. Uncertain if patient had any loss of bladder function as patient wears briefs, however daughter does note that patient's briefs were wet on arrival to EMS. No prior history of seizures, strokes, CAD, or CHF. NSR on EKG. Troponin 9-9 / proBNP 221. CBC, CMP, lipase unremarkable. Chest x-ray and CT head negative for any acute processes. Cholelithiasis seen on CTA. UA shows trace LES. Per daughter, patient is easily distractible and can recognize but not remember family members. States that patient is at her baseline during exam.  History was obtained from patient's daughter.   No focal neurological deficits appreciated on limited neurological

## 2023-11-24 NOTE — PROGRESS NOTES
Pt attempting to get out of bed take of telemetry multiple time. Pt not redirectable. Provider notified Kayla Wills MD.     Per provider d/c fluids and okay for patient to be off telemetry.     Amy Gregory RN

## 2023-11-24 NOTE — ED NOTES
Pt family reported pt did not take her HTN meds this am. Writer to have Resident order.      [address] [facility], [address] [place] [address]  11/24/23 1931

## 2023-11-24 NOTE — CONSULTS
History Narrative    Not on file     Social Determinants of Health     Financial Resource Strain: Not on file   Food Insecurity: No Food Insecurity (11/24/2023)    Hunger Vital Sign     Worried About Running Out of Food in the Last Year: Never true     [address] in the Last Year: Never true   Transportation Needs: No Transportation Needs (11/24/2023)    Trans[facility] Problems [facility]     In the past 12 months, has lack of reliable transportation kept you from medical appointments, meetings, work or from getting things needed for daily living?: Not on file   Physical Activity: Not on file   Stress: Not on file   Social Connections: Not on file   Intimate Partner Violence: Not on file   Housing Stability: [address]  (11/24/2023)    Housing Stability Vital Sign     Unable to Pay for Housing in the Last Year: No     Number of [address] in the Last Year: 1     Unstable Housing in the Last Year: No      General examination:    Head: Normocephalic, atraumatic  Eyes: Extraocular movements intact  Lungs: Respirations unlabored, chest wall no deformity  ENT: Normal external ear canals, no sinus tenderness  Heart: Regular rate rhythm  Abdomen: No masses, tenderness  Extremities: No cyanosis or edema, 2+ pulses  Skin: Intact, normal skin color    Neurological examination:    Mental status   Perseverating alert to the name and knows her daughter. She does not answer the other questions and tries to remove all the wires from her body.      Cranial nerves   II - visual fields intact to confrontation                                                III, IV, VI - extra-ocular muscles full: no pupillary defect; no ROOSEVELT, no nystagmus, no ptosis   V - normal facial sensation                                                               VII - normal facial symmetry                                                             VIII - intact hearing                                                                             IX, X - already has an neurologist.  [facility] close follow-up in 4 to 6 weeks. Neurology will sign off.   Please page neurology for further questions or concerns    Electronically signed by Roberta Dotson MD on 11/24/2023 at 2:57 PM

## 2023-11-24 NOTE — PROGRESS NOTES
[facility] - [address] [place] [address]  PROGRESS NOTE    Shift date: 11/23/23  Shift day: Thursday   Shift # 2    Room # 30/30   Name: Abdirahman Soliman                Rastafari:    Place of Jewish:     Referral: Routine Visit    Admit Date & Time: 11/23/2023  4:47 PM    Assessment:  Abdirahman Soliman is a 80 y.o. female in the hospital     Intervention:  Writer introduced self and title as . Patient did not appear to mind  presence and engaged in conversation. Patient had family present during visit for additional support.  provided a supportive presence through active listening and words of affirmation. Outcome:  Patient and family appeared receptive to  visit. Plan:  Chaplains will remain available to offer spiritual and emotional support as needed.       Electronically signed by Vernell Back on 11/23/2023 at [address]  [phone]

## 2023-11-24 NOTE — PROCEDURES
EEG REPORT       Patient: Stephen Rust Age: 80 y.o. MRN: [id number]      Referring Provider: No ref. provider found    History: This routine 30 minute scalp EEG was recorded with video- monitoring for a 80 y.o. Altaf Berry female who presented with encephalopathy. This EEG was performed to evaluate for focal and epileptiform abnormalities.      Stephen Rust   Current Facility-Administered Medications   Medication Dose Route Frequency Provider Last Rate Last Admin    sodium chloride flush 0.9 % injection 5-40 mL  5-40 mL IntraVENous 2 times per day Keith Thomas MD   10 mL at 11/24/23 0827    sodium chloride flush 0.9 % injection 5-40 mL  5-40 mL IntraVENous PRN Ruby Cummins See ([facility]), MD        0.9 % sodium chloride infusion   IntraVENous PRN Ruby Cumminsy See ([facility]), MD        potassium chloride (KLOR-CON M) extended release tablet 40 mEq  40 mEq Oral PRN Ruby Cumminsy See ([facility]), MD        Or    potassium bicarb-citric acid (EFFER-K) effervescent tablet 40 mEq  40 mEq Oral PRN Ruby Cummins [place] ([facility]), MD        Or    potassium chloride 10 mEq/100 mL IVPB (Peripheral Line)  10 mEq IntraVENous PRN Ruby Cumminsy See ([facility]), MD        magnesium sulfate 2000 mg in 50 mL IVPB premix  2,000 mg IntraVENous PRN Ruby Cummins See ([facility]), MD        enoxaparin (LOVENOX) injection 40 mg  40 mg SubCUTAneous Daily Ruby Cumminsy See ([facility]), MD   40 mg at 11/24/23 0825    ondansetron (ZOFRAN-ODT) disintegrating tablet 4 mg  4 mg Oral Q8H PRN Ruby Cummins See ([facility]), MD        Or    ondansetron (ZOFRAN) injection 4 mg  4 mg IntraVENous Q6H PRN Ruby Cummins See ([facility]), MD        polyethylene glycol (GLYCOLAX) packet 17 g  17 g Oral Daily PRN Ruby Cummins ([facility]), MD        acetaminophen (TYLENOL) tablet 650 mg  650 mg Oral Q6H PRN Ruby Cummins ([facility]), MD        Or    acetaminophen (TYLENOL) suppository 650 mg  650 mg Rectal Q6H PRN Keith Thomas MD        0.9 % sodium

## 2023-11-25 ENCOUNTER — APPOINTMENT (OUTPATIENT)
Age: 88
End: 2023-11-25
Payer: MEDICARE

## 2023-11-25 VITALS
RESPIRATION RATE: 19 BRPM | BODY MASS INDEX: 24.35 KG/M2 | SYSTOLIC BLOOD PRESSURE: 132 MMHG | DIASTOLIC BLOOD PRESSURE: 66 MMHG | HEIGHT: 60 IN | OXYGEN SATURATION: 95 % | TEMPERATURE: 97.9 F | HEART RATE: 59 BPM | WEIGHT: 124 LBS

## 2023-11-25 LAB
ANION GAP SERPL CALCULATED.3IONS-SCNC: 8 MMOL/L (ref 9–17)
BASOPHILS # BLD: 0.05 K/UL (ref 0–0.2)
BASOPHILS NFR BLD: 1 % (ref 0–2)
BUN SERPL-MCNC: 12 MG/DL (ref 8–23)
CALCIUM SERPL-MCNC: 9.9 MG/DL (ref 8.6–10.4)
CHLORIDE SERPL-SCNC: 109 MMOL/L (ref 98–107)
CO2 SERPL-SCNC: 25 MMOL/L (ref 20–31)
CREAT SERPL-MCNC: 0.8 MG/DL (ref 0.5–0.9)
ECHO AO ROOT DIAM: 2.8 CM
ECHO AO ROOT INDEX: 1.84 CM/M2
ECHO AV AREA PEAK VELOCITY: 1.6 CM2
ECHO AV AREA VTI: 1.6 CM2
ECHO AV AREA/BSA PEAK VELOCITY: 1.1 CM2/M2
ECHO AV AREA/BSA VTI: 1.1 CM2/M2
ECHO AV MEAN GRADIENT: 4 MMHG
ECHO AV MEAN VELOCITY: 0.9 M/S
ECHO AV PEAK GRADIENT: 8 MMHG
ECHO AV PEAK VELOCITY: 1.4 M/S
ECHO AV VELOCITY RATIO: 0.5
ECHO AV VTI: 29 CM
ECHO BSA: 1.54 M2
ECHO BSA: 1.54 M2
ECHO EST RA PRESSURE: 3 MMHG
ECHO LA AREA 2C: 18.4 CM2
ECHO LA AREA 4C: 16.5 CM2
ECHO LA DIAMETER INDEX: 2.24 CM/M2
ECHO LA DIAMETER: 3.4 CM
ECHO LA MAJOR AXIS: 5.7 CM
ECHO LA MINOR AXIS: 4.9 CM
ECHO LA TO AORTIC ROOT RATIO: 1.21
ECHO LA VOL BP: 49 ML (ref 22–52)
ECHO LA VOL MOD A2C: 56 ML (ref 22–52)
ECHO LA VOL MOD A4C: 38 ML (ref 22–52)
ECHO LA VOL/BSA BIPLANE: 32 ML/M2 (ref 16–34)
ECHO LA VOLUME INDEX MOD A2C: 37 ML/M2 (ref 16–34)
ECHO LA VOLUME INDEX MOD A4C: 25 ML/M2 (ref 16–34)
ECHO LV E' LATERAL VELOCITY: 8 CM/S
ECHO LV E' SEPTAL VELOCITY: 6 CM/S
ECHO LV EDV A2C: 58 ML
ECHO LV EDV A4C: 66 ML
ECHO LV EDV INDEX A4C: 43 ML/M2
ECHO LV EDV NDEX A2C: 38 ML/M2
ECHO LV EJECTION FRACTION A2C: 55 %
ECHO LV EJECTION FRACTION A4C: 55 %
ECHO LV EJECTION FRACTION BIPLANE: 55 % (ref 55–100)
ECHO LV ESV A2C: 26 ML
ECHO LV ESV A4C: 30 ML
ECHO LV ESV INDEX A2C: 17 ML/M2
ECHO LV ESV INDEX A4C: 20 ML/M2
ECHO LV FRACTIONAL SHORTENING: 36 % (ref 28–44)
ECHO LV INTERNAL DIMENSION DIASTOLE INDEX: 2.96 CM/M2
ECHO LV INTERNAL DIMENSION DIASTOLIC: 4.5 CM (ref 3.9–5.3)
ECHO LV INTERNAL DIMENSION SYSTOLIC INDEX: 1.91 CM/M2
ECHO LV INTERNAL DIMENSION SYSTOLIC: 2.9 CM
ECHO LV IVSD: 0.9 CM (ref 0.6–0.9)
ECHO LV MASS 2D: 123.1 G (ref 67–162)
ECHO LV MASS INDEX 2D: 81 G/M2 (ref 43–95)
ECHO LV POSTERIOR WALL DIASTOLIC: 0.8 CM (ref 0.6–0.9)
ECHO LV RELATIVE WALL THICKNESS RATIO: 0.36
ECHO LVOT AREA: 3.1 CM2
ECHO LVOT AV VTI INDEX: 0.51
ECHO LVOT DIAM: 2 CM
ECHO LVOT MEAN GRADIENT: 1 MMHG
ECHO LVOT PEAK GRADIENT: 2 MMHG
ECHO LVOT PEAK VELOCITY: 0.7 M/S
ECHO LVOT STROKE VOLUME INDEX: 30.6 ML/M2
ECHO LVOT SV: 46.5 ML
ECHO LVOT VTI: 14.8 CM
ECHO MV A VELOCITY: 0.98 M/S
ECHO MV AREA VTI: 1.5 CM2
ECHO MV E DECELERATION TIME (DT): 277 MS
ECHO MV E VELOCITY: 0.62 M/S
ECHO MV E/A RATIO: 0.63
ECHO MV E/E' LATERAL: 7.75
ECHO MV E/E' RATIO (AVERAGED): 9.04
ECHO MV LVOT VTI INDEX: 2.07
ECHO MV MAX VELOCITY: 1.2 M/S
ECHO MV MEAN GRADIENT: 1 MMHG
ECHO MV MEAN VELOCITY: 0.5 M/S
ECHO MV PEAK GRADIENT: 6 MMHG
ECHO MV VTI: 30.7 CM
ECHO PV MAX VELOCITY: 0.8 M/S
ECHO PV PEAK GRADIENT: 3 MMHG
ECHO RIGHT VENTRICULAR SYSTOLIC PRESSURE (RVSP): 25 MMHG
ECHO RV BASAL DIMENSION: 3.2 CM
ECHO RV FREE WALL PEAK S': 14 CM/S
ECHO RV TAPSE: 1.8 CM (ref 1.7–?)
ECHO TV REGURGITANT MAX VELOCITY: 2.34 M/S
ECHO TV REGURGITANT PEAK GRADIENT: 22 MMHG
EOSINOPHIL # BLD: 0.14 K/UL (ref 0–0.44)
EOSINOPHILS RELATIVE PERCENT: 2 % (ref 1–4)
ERYTHROCYTE [DISTWIDTH] IN BLOOD BY AUTOMATED COUNT: 13.2 % (ref 11.8–14.4)
GFR SERPL CREATININE-BSD FRML MDRD: >60 ML/MIN/1.73M2
GLUCOSE SERPL-MCNC: 82 MG/DL (ref 70–99)
HCT VFR BLD AUTO: 41.6 % (ref 36.3–47.1)
HGB BLD-MCNC: 13.3 G/DL (ref 11.9–15.1)
IMM GRANULOCYTES # BLD AUTO: <0.03 K/UL (ref 0–0.3)
IMM GRANULOCYTES NFR BLD: 0 %
LYMPHOCYTES NFR BLD: 1.3 K/UL (ref 1.1–3.7)
LYMPHOCYTES RELATIVE PERCENT: 21 % (ref 24–43)
MCH RBC QN AUTO: 31.1 PG (ref 25.2–33.5)
MCHC RBC AUTO-ENTMCNC: 32 G/DL (ref 28.4–34.8)
MCV RBC AUTO: 97.2 FL (ref 82.6–102.9)
MONOCYTES NFR BLD: 0.57 K/UL (ref 0.1–1.2)
MONOCYTES NFR BLD: 9 % (ref 3–12)
NEUTROPHILS NFR BLD: 67 % (ref 36–65)
NEUTS SEG NFR BLD: 4.04 K/UL (ref 1.5–8.1)
NRBC BLD-RTO: 0 PER 100 WBC
PLATELET # BLD AUTO: 223 K/UL (ref 138–453)
PMV BLD AUTO: 10 FL (ref 8.1–13.5)
POTASSIUM SERPL-SCNC: 4.2 MMOL/L (ref 3.7–5.3)
RBC # BLD AUTO: 4.28 M/UL (ref 3.95–5.11)
SODIUM SERPL-SCNC: 142 MMOL/L (ref 135–144)
WBC OTHER # BLD: 6.1 K/UL (ref 3.5–11.3)

## 2023-11-25 PROCEDURE — 85025 COMPLETE CBC W/AUTO DIFF WBC: CPT

## 2023-11-25 PROCEDURE — 99232 SBSQ HOSP IP/OBS MODERATE 35: CPT | Performed by: INTERNAL MEDICINE

## 2023-11-25 PROCEDURE — 99233 SBSQ HOSP IP/OBS HIGH 50: CPT | Performed by: NURSE PRACTITIONER

## 2023-11-25 PROCEDURE — 6360000002 HC RX W HCPCS

## 2023-11-25 PROCEDURE — 93225 XTRNL ECG REC<48 HRS REC: CPT

## 2023-11-25 PROCEDURE — 93227 XTRNL ECG REC<48 HR R&I: CPT | Performed by: INTERNAL MEDICINE

## 2023-11-25 PROCEDURE — 99222 1ST HOSP IP/OBS MODERATE 55: CPT | Performed by: INTERNAL MEDICINE

## 2023-11-25 PROCEDURE — 6370000000 HC RX 637 (ALT 250 FOR IP)

## 2023-11-25 PROCEDURE — 80048 BASIC METABOLIC PNL TOTAL CA: CPT

## 2023-11-25 PROCEDURE — 2580000003 HC RX 258

## 2023-11-25 PROCEDURE — 36415 COLL VENOUS BLD VENIPUNCTURE: CPT

## 2023-11-25 RX ADMIN — OXYBUTYNIN CHLORIDE 5 MG: 5 TABLET, EXTENDED RELEASE ORAL at 09:26

## 2023-11-25 RX ADMIN — ENOXAPARIN SODIUM 40 MG: 100 INJECTION SUBCUTANEOUS at 09:26

## 2023-11-25 RX ADMIN — SODIUM CHLORIDE, PRESERVATIVE FREE 10 ML: 5 INJECTION INTRAVENOUS at 09:26

## 2023-11-25 RX ADMIN — LISINOPRIL 20 MG: 20 TABLET ORAL at 09:26

## 2023-11-25 NOTE — PROGRESS NOTES
[facility]  Internal Medicine Teaching Residency Program  Inpatient Daily Progress Note  ______________________________________________________________________________    Patient: Saloni Marcelino  YOB: 1934   RIT:[id number]    Acct: [de-identified]     Room: [address] [place] [address]  Admit date: 11/23/2023  Today's date: 11/25/23  Number of days in the hospital: 1    SUBJECTIVE   Admitting Diagnosis: Syncope  CC: AMS  Pt examined at bedside. Chart & results reviewed. Agitated overnight, trying to get OOB and removing lines. Received one dose of seroquel. Afebrile, HDSS on RA this morning. Patient appears calm this morning, reports no complaints. Appears to be doing well. ROS:  Constitutional:  negative for chills, fevers, sweats  Respiratory:  negative for cough, dyspnea on exertion, hemoptysis, shortness of breath, wheezing  Cardiovascular:  negative for chest pain, chest pressure/discomfort, lower extremity edema, palpitations  Gastrointestinal:  negative for abdominal pain, constipation, diarrhea, nausea, vomiting  Neurological:  negative for dizziness, headache  BRIEF HISTORY     The patient is a pleasant 80 y.o. female with a PMH of Alzeihmer's dementia, urinary incontience, and hypertension presents with a chief complaint of AMS. Patient was sitting at the dinner table with her family and fell asleep, which is not unusual for her, however typically patient is easy to arouse when she does. This time, patient became unresponsive, started drooling and began leaning over to 1 side. Family moved patient to the ground and patient was able to respond to sternal rub. This episode lasted about 10 to 15 minutes. On EMS arrival, patient was placed on 2 L of nasal cannula for an SPO2 of 93% on room air. Vital signs were otherwise stable. Per patient's daughter, patient appeared to be confused after the episode and repeating her own name, \"Mable. \"  Denies tongue

## 2023-11-25 NOTE — PLAN OF CARE
Problem: Discharge Planning  Goal: Discharge to home or other facility with appropriate resources  Outcome: Progressing     Problem: ABCDS Injury Assessment  Goal: Absence of physical injury  Outcome: Progressing     Problem: Confusion  Goal: Confusion, delirium, dementia, or psychosis is improved or at baseline  Description: INTERVENTIONS:  1. Assess for possible contributors to thought disturbance, including medications, impaired vision or hearing, underlying metabolic abnormalities, dehydration, psychiatric diagnoses, and notify attending LIP  2. [place] high risk fall precautions, as indicated  3. Provide frequent short contacts to provide reality reorientation, refocusing and direction  4. Decrease environmental stimuli, including noise as appropriate  5. Monitor and intervene to maintain adequate nutrition, hydration, elimination, sleep and activity  6. If unable to ensure safety without constant attention obtain sitter and review sitter guidelines with assigned personnel  7. Initiate Psychosocial CNS and Spiritual Care consult, as indicated  Outcome: Progressing  Flowsheets (Taken 11/24/2023 0327)  Effect of thought disturbance (confusion, delirium, dementia, or psychosis) are managed with adequate functional status:   Assess for contributors to thought disturbance, including medications, impaired vision or hearing, underlying metabolic abnormalities, dehydration, psychiatric diagnoses, notify [facility] high risk fall precautions, as indicated   Provide frequent short contacts to provide reality reorientation, refocusing and direction   Decrease environmental stimuli, including noise as appropriate   If unable to ensure safety without constant attention obtain sitter and review sitter guidelines with assigned personnel   Initiate Psychosocial Clinical Nurse Specialist and [address] [place] [address] consult, as indicated     Problem: Skin/Tissue Integrity  Goal: Absence of new skin breakdown  Description: 1.
Problem: Discharge Planning  Goal: Discharge to home or other facility with appropriate resources  Outcome: Progressing     Problem: ABCDS Injury Assessment  Goal: Absence of physical injury  Outcome: Progressing  Flowsheets (Taken 11/24/2023 0800)  Absence of Physical Injury: Implement safety measures based on patient assessment     Problem: Confusion  Goal: Confusion, delirium, dementia, or psychosis is improved or at baseline  Description: INTERVENTIONS:  1. Assess for possible contributors to thought disturbance, including medications, impaired vision or hearing, underlying metabolic abnormalities, dehydration, psychiatric diagnoses, and notify attending LIP  2. [place] high risk fall precautions, as indicated  3. Provide frequent short contacts to provide reality reorientation, refocusing and direction  4. Decrease environmental stimuli, including noise as appropriate  5. Monitor and intervene to maintain adequate nutrition, hydration, elimination, sleep and activity  6. If unable to ensure safety without constant attention obtain sitter and review sitter guidelines with assigned personnel  7. Initiate Psychosocial CNS and Spiritual Care consult, as indicated  Outcome: Progressing     Problem: Skin/Tissue Integrity  Goal: Absence of new skin breakdown  Description: 1. Monitor for areas of redness and/or skin breakdown  2. Assess vascular access sites hourly  3. Every 4-6 hours minimum:  Change oxygen saturation probe site  4. Every 4-6 hours:  If on nasal continuous positive airway pressure, respiratory therapy assess nares and determine need for appliance change or resting period.   Outcome: Progressing     Problem: Safety - Adult  Goal: Free from fall injury  Outcome: Progressing  Flowsheets (Taken 11/24/2023 0800)  Free From Fall Injury: Instruct family/caregiver on patient safety     Problem: Neurosensory - Adult  Goal: Achieves stable or improved neurological status  Outcome: Progressing  Goal:
Maintains optimal cardiac output and hemodynamic stability  Outcome: Completed  Goal: Absence of cardiac dysrhythmias or at baseline  Outcome: Completed     Problem: Musculoskeletal - Adult  Goal: Return mobility to safest level of function  Outcome: Completed  Goal: Return ADL status to a safe level of function  Outcome: Completed     Problem: Gastrointestinal - Adult  Goal: Maintains adequate nutritional intake  Outcome: Completed
period.   11/24/2023 2315 by Annamarie Weeks RN  Outcome: Progressing  11/24/2023 1823 by Chris Arenas RN  Outcome: Progressing     Problem: Safety - Adult  Goal: Free from fall injury  11/24/2023 2315 by Annamarie Weeks RN  Outcome: Progressing  11/24/2023 1823 by Chris Arenas RN  Outcome: Progressing  Flowsheets (Taken 11/24/2023 0800)  Free From Fall Injury: Instruct family/caregiver on patient safety     Problem: Neurosensory - Adult  Goal: Achieves stable or improved neurological status  11/24/2023 2315 by Annamarie Weeks RN  Outcome: Progressing  11/24/2023 1823 by Chris Arenas RN  Outcome: Progressing  Goal: Absence of seizures  11/24/2023 2315 by Annamarie Weeks RN  Outcome: Progressing  11/24/2023 1823 by Chris Arenas RN  Outcome: Progressing  Goal: Remains free of injury related to seizures activity  11/24/2023 2315 by Annamarie Weeks RN  Outcome: Progressing  11/24/2023 1823 by Chris Arenas RN  Outcome: Progressing  Goal: Achieves maximal functionality and self care  11/24/2023 2315 by Annamarie Weeks RN  Outcome: Progressing  11/24/2023 1823 by Chris Arenas RN  Outcome: Progressing     Problem: Cardiovascular - Adult  Goal: Maintains optimal cardiac output and hemodynamic stability  11/24/2023 2315 by Annamarie Weeks RN  Outcome: Progressing  11/24/2023 1823 by Chris Arenas RN  Outcome: Progressing  Goal: Absence of cardiac dysrhythmias or at baseline  11/24/2023 2315 by Annamarie Weeks RN  Outcome: Progressing  11/24/2023 1823 by Chris Arenas RN  Outcome: Progressing     Problem: Musculoskeletal - Adult  Goal: Return mobility to safest level of function  11/24/2023 2315 by Annamarie Weeks RN  Outcome: Progressing  11/24/2023 1823 by Chris Arenas RN  Outcome: Progressing  Goal: Return ADL status to a safe level of function  11/24/2023 2315 by [facility], [address] [place] [address] [address],

## 2023-12-01 LAB — ECHO BSA: 1.54 M2

## 2024-08-20 ENCOUNTER — OFFICE VISIT (OUTPATIENT)
Dept: PRIMARY CARE CLINIC | Age: 89
End: 2024-08-20
Payer: MEDICARE

## 2024-08-20 VITALS
TEMPERATURE: 97.7 F | HEART RATE: 71 BPM | DIASTOLIC BLOOD PRESSURE: 77 MMHG | OXYGEN SATURATION: 94 % | SYSTOLIC BLOOD PRESSURE: 159 MMHG

## 2024-08-20 DIAGNOSIS — J06.9 VIRAL URI: ICD-10-CM

## 2024-08-20 DIAGNOSIS — N30.01 ACUTE CYSTITIS WITH HEMATURIA: Primary | ICD-10-CM

## 2024-08-20 DIAGNOSIS — R39.9 UTI SYMPTOMS: ICD-10-CM

## 2024-08-20 LAB
BILIRUBIN, POC: ABNORMAL
BLOOD URINE, POC: ABNORMAL
CLARITY, POC: CLEAR
COLOR, POC: ABNORMAL
GLUCOSE URINE, POC: ABNORMAL
KETONES, POC: ABNORMAL
LEUKOCYTE EST, POC: ABNORMAL
NITRITE, POC: ABNORMAL
PH, POC: 7
PROTEIN, POC: ABNORMAL
SPECIFIC GRAVITY, POC: 1.02
UROBILINOGEN, POC: 0.2

## 2024-08-20 PROCEDURE — 1036F TOBACCO NON-USER: CPT | Performed by: NURSE PRACTITIONER

## 2024-08-20 PROCEDURE — 1123F ACP DISCUSS/DSCN MKR DOCD: CPT | Performed by: NURSE PRACTITIONER

## 2024-08-20 PROCEDURE — G8427 DOCREV CUR MEDS BY ELIG CLIN: HCPCS | Performed by: NURSE PRACTITIONER

## 2024-08-20 PROCEDURE — 99203 OFFICE O/P NEW LOW 30 MIN: CPT | Performed by: NURSE PRACTITIONER

## 2024-08-20 PROCEDURE — 1090F PRES/ABSN URINE INCON ASSESS: CPT | Performed by: NURSE PRACTITIONER

## 2024-08-20 PROCEDURE — 81002 URINALYSIS NONAUTO W/O SCOPE: CPT | Performed by: NURSE PRACTITIONER

## 2024-08-20 PROCEDURE — G8420 CALC BMI NORM PARAMETERS: HCPCS | Performed by: NURSE PRACTITIONER

## 2024-08-20 RX ORDER — CEPHALEXIN 500 MG/1
500 CAPSULE ORAL 2 TIMES DAILY
Qty: 14 CAPSULE | Refills: 0 | Status: SHIPPED | OUTPATIENT
Start: 2024-08-20 | End: 2024-08-27

## 2024-08-20 ASSESSMENT — ENCOUNTER SYMPTOMS
EYE DISCHARGE: 0
CHEST TIGHTNESS: 0
EYE REDNESS: 0
NAUSEA: 0
VOICE CHANGE: 0
DIARRHEA: 0
SORE THROAT: 0
WHEEZING: 0
VOMITING: 0
ABDOMINAL PAIN: 0
CONSTIPATION: 0
SINUS PRESSURE: 0
SHORTNESS OF BREATH: 0
COUGH: 1

## 2024-08-20 NOTE — PROGRESS NOTES
[facility]YS[facility], [facility] [place] [facility]  [address]  [facility] [address]  Dept: [phone]  Dept Fax: [phone]     Mable Bai is a 89 y.o. female who presents to the urgent care today for her medicalconditions/complaints as noted below.  Mable Bai is c/o of Cough (Started approx on Friday) and Urinary Tract Infection (Can't hold urine - has been having accidents approx 1 wk)    HPI:      Urinary Frequency   This is a new problem. The current episode started in the past 7 days. The problem has been unchanged. The patient is experiencing no pain. There has been no fever. She is Not sexually active. There is No history of pyelonephritis. Associated symptoms include frequency and urgency. Pertinent negatives include no chills, flank pain, hematuria, nausea or vomiting. She has tried nothing for the symptoms. The treatment provided no relief.   Cough  This is a new problem. The current episode started in the past 7 days. The problem has been gradually improving. The cough is Productive of purulent sputum. Associated symptoms include nasal congestion and postnasal drip. Pertinent negatives include no chest pain, chills, ear pain, eye redness, fever, headaches, myalgias, rash, sore throat, shortness of breath or wheezing. She has tried nothing for the symptoms. The treatment provided mild relief.     Daughter present with patient and is a reliable historian.    History reviewed. No pertinent past medical history.     Current Outpatient Medications   Medication Sig Dispense Refill    cephALEXin (KEFLEX) 500 MG capsule Take 1 capsule by mouth 2 times daily for 7 days 14 capsule 0    ramipril (ALTACE) 5 MG capsule Take 1 capsule by mouth daily      oxybutynin (DITROPAN-XL) 5 MG extended release tablet Take 1 tablet by mouth daily      donepezil (ARICEPT) 10 MG tablet Take 1 tablet by mouth daily      Cholecalciferol 50 MCG (2000 UT) CAPS

## 2024-09-11 ENCOUNTER — OFFICE VISIT (OUTPATIENT)
Dept: PRIMARY CARE CLINIC | Age: 89
End: 2024-09-11

## 2024-09-11 ENCOUNTER — HOSPITAL ENCOUNTER (OUTPATIENT)
Age: 89
Setting detail: SPECIMEN
Discharge: HOME | End: 2024-09-11

## 2024-09-11 VITALS
OXYGEN SATURATION: 98 % | DIASTOLIC BLOOD PRESSURE: 68 MMHG | WEIGHT: 124 LBS | HEIGHT: 60 IN | HEART RATE: 75 BPM | BODY MASS INDEX: 24.35 KG/M2 | TEMPERATURE: 98.3 F | SYSTOLIC BLOOD PRESSURE: 111 MMHG

## 2024-09-11 DIAGNOSIS — R35.0 FREQUENT URINATION: ICD-10-CM

## 2024-09-11 DIAGNOSIS — A49.8 PROTEUS MIRABILIS INFECTION: Primary | ICD-10-CM

## 2024-09-11 LAB
BILIRUBIN, POC: NEGATIVE
BLOOD URINE, POC: ABNORMAL
CLARITY, POC: ABNORMAL
COLOR, POC: YELLOW
GLUCOSE URINE, POC: NEGATIVE MG/DL
KETONES, POC: NEGATIVE MG/DL
LEUKOCYTE EST, POC: ABNORMAL
NITRITE, POC: POSITIVE
PH, POC: 6
PROTEIN, POC: ABNORMAL MG/DL
SPECIFIC GRAVITY, POC: 1.02
UROBILINOGEN, POC: 0.2 MG/DL

## 2024-09-11 RX ORDER — CEPHALEXIN 500 MG/1
500 CAPSULE ORAL 2 TIMES DAILY
Qty: 14 CAPSULE | Refills: 0 | Status: SHIPPED | OUTPATIENT
Start: 2024-09-11 | End: 2024-09-18

## 2024-09-11 ASSESSMENT — ENCOUNTER SYMPTOMS
EYE DISCHARGE: 0
EYE PAIN: 0
DIARRHEA: 0
ANAL BLEEDING: 0
EYES NEGATIVE: 1
CHOKING: 0
APNEA: 0
RECTAL PAIN: 0
FACIAL SWELLING: 0
SHORTNESS OF BREATH: 0
VOICE CHANGE: 0
SORE THROAT: 0
ABDOMINAL PAIN: 0
RHINORRHEA: 0
RESPIRATORY NEGATIVE: 1
BLOOD IN STOOL: 0
STRIDOR: 0
ABDOMINAL DISTENTION: 0
GASTROINTESTINAL NEGATIVE: 1
COUGH: 0
EYE REDNESS: 0
CONSTIPATION: 0
PHOTOPHOBIA: 0
TROUBLE SWALLOWING: 0
SINUS PRESSURE: 0
COLOR CHANGE: 0
EYE ITCHING: 0
WHEEZING: 0
SINUS PAIN: 0
BACK PAIN: 0
NAUSEA: 0
VOMITING: 0
CHEST TIGHTNESS: 0

## 2024-09-12 DIAGNOSIS — R35.0 FREQUENT URINATION: ICD-10-CM

## 2024-09-14 LAB
MICROORGANISM SPEC CULT: ABNORMAL
SERVICE CMNT-IMP: ABNORMAL
SPECIMEN DESCRIPTION: ABNORMAL

## 2024-09-26 ENCOUNTER — OFFICE VISIT (OUTPATIENT)
Dept: PRIMARY CARE CLINIC | Age: 89
End: 2024-09-26

## 2024-09-26 ENCOUNTER — HOSPITAL ENCOUNTER (OUTPATIENT)
Age: 89
Setting detail: SPECIMEN
Discharge: HOME | End: 2024-09-26

## 2024-09-26 VITALS
OXYGEN SATURATION: 97 % | HEART RATE: 75 BPM | DIASTOLIC BLOOD PRESSURE: 87 MMHG | TEMPERATURE: 97.6 F | SYSTOLIC BLOOD PRESSURE: 164 MMHG

## 2024-09-26 DIAGNOSIS — R39.9 UTI SYMPTOMS: ICD-10-CM

## 2024-09-26 DIAGNOSIS — N30.00 ACUTE CYSTITIS WITHOUT HEMATURIA: ICD-10-CM

## 2024-09-26 DIAGNOSIS — N30.00 ACUTE CYSTITIS WITHOUT HEMATURIA: Primary | ICD-10-CM

## 2024-09-26 LAB
BILIRUBIN, POC: ABNORMAL
BLOOD URINE, POC: ABNORMAL
CLARITY, POC: ABNORMAL
COLOR, POC: YELLOW
GLUCOSE URINE, POC: ABNORMAL MG/DL
KETONES, POC: ABNORMAL MG/DL
LEUKOCYTE EST, POC: ABNORMAL
NITRITE, POC: ABNORMAL
PH, POC: 6.5
PROTEIN, POC: ABNORMAL MG/DL
SPECIFIC GRAVITY, POC: 1.02
UROBILINOGEN, POC: 0.2 MG/DL

## 2024-09-26 RX ORDER — SULFAMETHOXAZOLE/TRIMETHOPRIM 800-160 MG
1 TABLET ORAL 2 TIMES DAILY
Qty: 20 TABLET | Refills: 0 | Status: SHIPPED | OUTPATIENT
Start: 2024-09-26 | End: 2024-10-06

## 2024-09-26 ASSESSMENT — ENCOUNTER SYMPTOMS
CHEST TIGHTNESS: 0
NAUSEA: 0
ABDOMINAL PAIN: 0
WHEEZING: 0
COUGH: 0
CONSTIPATION: 0
VOMITING: 0
DIARRHEA: 0
SHORTNESS OF BREATH: 0
RESPIRATORY NEGATIVE: 1

## 2024-09-28 LAB
MICROORGANISM SPEC CULT: ABNORMAL
SERVICE CMNT-IMP: ABNORMAL
SPECIMEN DESCRIPTION: ABNORMAL

## 2024-12-10 ENCOUNTER — HOSPITAL ENCOUNTER (OUTPATIENT)
Age: 88
Setting detail: SPECIMEN
Discharge: HOME | End: 2024-12-10

## 2024-12-10 ENCOUNTER — OFFICE VISIT (OUTPATIENT)
Dept: PRIMARY CARE CLINIC | Age: 88
End: 2024-12-10
Payer: MEDICARE

## 2024-12-10 VITALS
DIASTOLIC BLOOD PRESSURE: 92 MMHG | TEMPERATURE: 97.8 F | HEART RATE: 64 BPM | SYSTOLIC BLOOD PRESSURE: 183 MMHG | OXYGEN SATURATION: 97 %

## 2024-12-10 DIAGNOSIS — R39.9 UTI SYMPTOMS: Primary | ICD-10-CM

## 2024-12-10 LAB
BILIRUBIN, POC: ABNORMAL
BLOOD URINE, POC: ABNORMAL
CLARITY, POC: CLEAR
COLOR, POC: YELLOW
GLUCOSE URINE, POC: ABNORMAL MG/DL
KETONES, POC: ABNORMAL MG/DL
LEUKOCYTE EST, POC: ABNORMAL
NITRITE, POC: ABNORMAL
PH, POC: 7
PROTEIN, POC: ABNORMAL MG/DL
SPECIFIC GRAVITY, POC: 1.02
UROBILINOGEN, POC: 0.2 MG/DL

## 2024-12-10 PROCEDURE — 1090F PRES/ABSN URINE INCON ASSESS: CPT | Performed by: NURSE PRACTITIONER

## 2024-12-10 PROCEDURE — 1123F ACP DISCUSS/DSCN MKR DOCD: CPT | Performed by: NURSE PRACTITIONER

## 2024-12-10 PROCEDURE — 1159F MED LIST DOCD IN RCRD: CPT | Performed by: NURSE PRACTITIONER

## 2024-12-10 PROCEDURE — G8427 DOCREV CUR MEDS BY ELIG CLIN: HCPCS | Performed by: NURSE PRACTITIONER

## 2024-12-10 PROCEDURE — 81002 URINALYSIS NONAUTO W/O SCOPE: CPT | Performed by: NURSE PRACTITIONER

## 2024-12-10 PROCEDURE — G8420 CALC BMI NORM PARAMETERS: HCPCS | Performed by: NURSE PRACTITIONER

## 2024-12-10 PROCEDURE — 1160F RVW MEDS BY RX/DR IN RCRD: CPT | Performed by: NURSE PRACTITIONER

## 2024-12-10 PROCEDURE — G8484 FLU IMMUNIZE NO ADMIN: HCPCS | Performed by: NURSE PRACTITIONER

## 2024-12-10 PROCEDURE — 1036F TOBACCO NON-USER: CPT | Performed by: NURSE PRACTITIONER

## 2024-12-10 PROCEDURE — 99213 OFFICE O/P EST LOW 20 MIN: CPT | Performed by: NURSE PRACTITIONER

## 2024-12-10 RX ORDER — CEPHALEXIN 500 MG/1
500 CAPSULE ORAL 2 TIMES DAILY
Qty: 14 CAPSULE | Refills: 0 | Status: CANCELLED | OUTPATIENT
Start: 2024-12-10 | End: 2024-12-17

## 2024-12-10 ASSESSMENT — ENCOUNTER SYMPTOMS
WHEEZING: 0
NAUSEA: 0
VOMITING: 0
SHORTNESS OF BREATH: 0
RESPIRATORY NEGATIVE: 1
CHEST TIGHTNESS: 0
COUGH: 0

## 2024-12-10 NOTE — PROGRESS NOTES
symptoms do not improve/worsen.       Patient given educational materials - see patient instructions.Discussed use, benefit, and side effects of prescribed medications.  All patientquestions answered.  Pt voiced understanding.    Electronically signed by SHANELLE Koo CNP on 12/10/2024at 6:47 PM

## 2024-12-11 DIAGNOSIS — R39.9 UTI SYMPTOMS: ICD-10-CM

## 2024-12-12 LAB
MICROORGANISM SPEC CULT: NORMAL
SERVICE CMNT-IMP: NORMAL
SPECIMEN DESCRIPTION: NORMAL

## 2025-01-21 ENCOUNTER — APPOINTMENT (OUTPATIENT)
Dept: CT IMAGING | Age: 89
End: 2025-01-21
Payer: MEDICARE

## 2025-01-21 ENCOUNTER — HOSPITAL ENCOUNTER (EMERGENCY)
Age: 89
Discharge: HOME | End: 2025-01-21
Attending: EMERGENCY MEDICINE
Payer: MEDICARE

## 2025-01-21 VITALS
HEART RATE: 88 BPM | WEIGHT: 130 LBS | SYSTOLIC BLOOD PRESSURE: 132 MMHG | HEIGHT: 64 IN | BODY MASS INDEX: 22.2 KG/M2 | TEMPERATURE: 97.6 F | OXYGEN SATURATION: 95 % | DIASTOLIC BLOOD PRESSURE: 98 MMHG | RESPIRATION RATE: 26 BRPM
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
## 2025-01-21 DIAGNOSIS — S08.0XXA SCALP AVULSION, INITIAL ENCOUNTER: ICD-10-CM

## 2025-01-21 LAB
25(OH)D3 SERPL-MCNC: 63.1 NG/ML (ref 30–100)
ABO + RH BLD: NORMAL
ALBUMIN SERPL-MCNC: 4.3 G/DL (ref 3.5–5.2)
ANION GAP SERPL CALCULATED.3IONS-SCNC: 13 MMOL/L (ref 9–16)
ARM BAND NUMBER: NORMAL
BLOOD BANK SAMPLE EXPIRATION: NORMAL
BLOOD BANK SPECIMEN: ABNORMAL
BLOOD GROUP ANTIBODIES SERPL: NEGATIVE
BODY TEMPERATURE: 37
BUN SERPL-MCNC: 25 MG/DL (ref 8–23)
CHLORIDE SERPL-SCNC: 105 MMOL/L (ref 98–107)
CO2 SERPL-SCNC: 25 MMOL/L (ref 20–31)
COHGB MFR BLD: 0.7 % (ref 0–5)
CREAT SERPL-MCNC: 1 MG/DL (ref 0.6–0.9)
ERYTHROCYTE [DISTWIDTH] IN BLOOD BY AUTOMATED COUNT: 13.3 % (ref 11.8–14.4)
EST. AVERAGE GLUCOSE BLD GHB EST-MCNC: 108 MG/DL
ETHANOL PERCENT: <0.01 %
ETHANOLAMINE SERPL-MCNC: <10 MG/DL (ref 0–0.08)
FIO2 ON VENT: ABNORMAL %
GFR, ESTIMATED: 38 ML/MIN/1.73M2
GLUCOSE SERPL-MCNC: 117 MG/DL (ref 74–99)
HBA1C MFR BLD: 5.4 % (ref 4–6)
HCG SERPL QL: NEGATIVE
HCO3 VENOUS: 27.8 MMOL/L (ref 24–30)
HCT VFR BLD AUTO: 42.1 % (ref 36.3–47.1)
HGB BLD-MCNC: 13.5 G/DL (ref 11.9–15.1)
INR PPP: 1
MCH RBC QN AUTO: 30.5 PG (ref 25.2–33.5)
MCHC RBC AUTO-ENTMCNC: 32.1 G/DL (ref 28.4–34.8)
MCV RBC AUTO: 95 FL (ref 82.6–102.9)
NRBC BLD-RTO: 0 PER 100 WBC
O2 SAT, VEN: 33.9 % (ref 60–85)
PARTIAL THROMBOPLASTIN TIME: 24.7 SEC (ref 23–36.5)
PCO2 VENOUS: 45.1 MM HG (ref 39–55)
PH VENOUS: 7.41 (ref 7.32–7.42)
PLATELET # BLD AUTO: 282 K/UL (ref 138–453)
PMV BLD AUTO: 9.7 FL (ref 8.1–13.5)
PO2 VENOUS: 20.6 MM HG (ref 30–50)
POSITIVE BASE EXCESS, VEN: 2.5 MMOL/L (ref 0–2)
POTASSIUM SERPL-SCNC: 4.1 MMOL/L (ref 3.7–5.3)
PROTHROMBIN TIME: 13.4 SEC (ref 11.7–14.9)
RBC # BLD AUTO: 4.43 M/UL (ref 3.95–5.11)
SODIUM SERPL-SCNC: 143 MMOL/L (ref 136–145)
T4 FREE SERPL-MCNC: 1.2 NG/DL (ref 0.9–1.7)
TROPONIN I SERPL HS-MCNC: 11 NG/L (ref 0–14)
TSH SERPL DL<=0.05 MIU/L-ACNC: 2.72 UIU/ML (ref 0.27–4.2)
VIT B12 SERPL-MCNC: 526 PG/ML (ref 232–1245)
WBC OTHER # BLD: 8.7 K/UL (ref 3.5–11.3)

## 2025-01-21 PROCEDURE — 6810039001 HC L1 TRAUMA PRIORITY

## 2025-01-21 PROCEDURE — 82607 VITAMIN B-12: CPT

## 2025-01-21 PROCEDURE — 6360000002 HC RX W HCPCS

## 2025-01-21 PROCEDURE — 72125 CT NECK SPINE W/O DYE: CPT

## 2025-01-21 PROCEDURE — 82040 ASSAY OF SERUM ALBUMIN: CPT

## 2025-01-21 PROCEDURE — 96376 TX/PRO/DX INJ SAME DRUG ADON: CPT

## 2025-01-21 PROCEDURE — 84484 ASSAY OF TROPONIN QUANT: CPT

## 2025-01-21 PROCEDURE — 85027 COMPLETE CBC AUTOMATED: CPT

## 2025-01-21 PROCEDURE — 6360000004 HC RX CONTRAST MEDICATION

## 2025-01-21 PROCEDURE — 71260 CT THORAX DX C+: CPT

## 2025-01-21 PROCEDURE — 84703 CHORIONIC GONADOTROPIN ASSAY: CPT

## 2025-01-21 PROCEDURE — 99284 EMERGENCY DEPT VISIT MOD MDM: CPT | Performed by: SURGERY

## 2025-01-21 PROCEDURE — G0480 DRUG TEST DEF 1-7 CLASSES: HCPCS

## 2025-01-21 PROCEDURE — 86901 BLOOD TYPING SEROLOGIC RH(D): CPT

## 2025-01-21 PROCEDURE — 84439 ASSAY OF FREE THYROXINE: CPT

## 2025-01-21 PROCEDURE — 86900 BLOOD TYPING SEROLOGIC ABO: CPT

## 2025-01-21 PROCEDURE — 85610 PROTHROMBIN TIME: CPT

## 2025-01-21 PROCEDURE — 82947 ASSAY GLUCOSE BLOOD QUANT: CPT

## 2025-01-21 PROCEDURE — 82306 VITAMIN D 25 HYDROXY: CPT

## 2025-01-21 PROCEDURE — 70450 CT HEAD/BRAIN W/O DYE: CPT

## 2025-01-21 PROCEDURE — 82565 ASSAY OF CREATININE: CPT

## 2025-01-21 PROCEDURE — 96375 TX/PRO/DX INJ NEW DRUG ADDON: CPT

## 2025-01-21 PROCEDURE — 80051 ELECTROLYTE PANEL: CPT

## 2025-01-21 PROCEDURE — 96374 THER/PROPH/DIAG INJ IV PUSH: CPT

## 2025-01-21 PROCEDURE — 85730 THROMBOPLASTIN TIME PARTIAL: CPT

## 2025-01-21 PROCEDURE — 12005 RPR S/N/A/GEN/TRK12.6-20.0CM: CPT | Performed by: SURGERY

## 2025-01-21 PROCEDURE — 84443 ASSAY THYROID STIM HORMONE: CPT

## 2025-01-21 PROCEDURE — 86850 RBC ANTIBODY SCREEN: CPT

## 2025-01-21 PROCEDURE — 82805 BLOOD GASES W/O2 SATURATION: CPT

## 2025-01-21 PROCEDURE — 99285 EMERGENCY DEPT VISIT HI MDM: CPT

## 2025-01-21 PROCEDURE — 12005 RPR S/N/A/GEN/TRK12.6-20.0CM: CPT

## 2025-01-21 PROCEDURE — 83036 HEMOGLOBIN GLYCOSYLATED A1C: CPT

## 2025-01-21 PROCEDURE — 84520 ASSAY OF UREA NITROGEN: CPT

## 2025-01-21 RX ORDER — FENTANYL CITRATE 50 UG/ML
25 INJECTION, SOLUTION INTRAMUSCULAR; INTRAVENOUS ONCE
Status: COMPLETED | OUTPATIENT
Start: 2025-01-21 | End: 2025-01-21

## 2025-01-21 RX ORDER — FENTANYL CITRATE 50 UG/ML
50 INJECTION, SOLUTION INTRAMUSCULAR; INTRAVENOUS ONCE
Status: COMPLETED | OUTPATIENT
Start: 2025-01-21 | End: 2025-01-21

## 2025-01-21 RX ORDER — FENTANYL CITRATE 50 UG/ML
INJECTION, SOLUTION INTRAMUSCULAR; INTRAVENOUS
Status: COMPLETED
Start: 2025-01-21 | End: 2025-01-21

## 2025-01-21 RX ORDER — CEPHALEXIN 500 MG/1
500 CAPSULE ORAL 4 TIMES DAILY
Qty: 28 CAPSULE | Refills: 0 | Status: SHIPPED | OUTPATIENT
Start: 2025-01-21 | End: 2025-01-21

## 2025-01-21 RX ORDER — CEPHALEXIN 500 MG/1
500 CAPSULE ORAL 4 TIMES DAILY
Qty: 28 CAPSULE | Refills: 0 | Status: SHIPPED | OUTPATIENT
Start: 2025-01-21 | End: 2025-01-28

## 2025-01-21 RX ORDER — IOPAMIDOL 755 MG/ML
130 INJECTION, SOLUTION INTRAVASCULAR
Status: COMPLETED | OUTPATIENT
Start: 2025-01-21 | End: 2025-01-21

## 2025-01-21 RX ADMIN — FENTANYL CITRATE 25 MCG: 50 INJECTION, SOLUTION INTRAMUSCULAR; INTRAVENOUS at 00:42

## 2025-01-21 RX ADMIN — FENTANYL CITRATE 50 MCG: 50 INJECTION, SOLUTION INTRAMUSCULAR; INTRAVENOUS at 01:41

## 2025-01-21 RX ADMIN — Medication 2000 MG: at 02:28

## 2025-01-21 RX ADMIN — IOPAMIDOL 130 ML: 755 INJECTION, SOLUTION INTRAVENOUS at 00:48

## 2025-01-21 RX ADMIN — FENTANYL CITRATE 25 MCG: 50 INJECTION, SOLUTION INTRAMUSCULAR; INTRAVENOUS at 02:35

## 2025-01-21 NOTE — H&P
TRAUMA H&P/CONSULT    PATIENT NAME: Dv Trauma Alden  YOB: 1880  MEDICAL RECORD NO. [id number]   DATE: 1/21/2025  PRIMARY CARE PHYSICIAN: No primary care provider on file.  PATIENT EVALUATED AT THE REQUEST OF DR.: Dr. Lara    ACTIVATION   Trauma Priority      IMPRESSION AND PLAN:       Diagnosis: FFSH, -AC, -LOC, Sculp lac    Plan:  F/u on image    Lac repair         If intracranial hemorrhage is present, is it a:  [] BIG 1  [] BIG 2  [] BIG 3  If chest wall injury: Rib score___    CONSULT SERVICES    none      HISTORY:     Chief Complaint:  \"FFSH\"    GENERAL DATA  Patient information was obtained from patient and EMS personnel.  History/Exam limitations: none.  Injury Date: 1/21/2025   Approximate Injury Time: 12:30        Transport mode: Ambulance  Referring Hospital: none    SETTING OF TRAUMATIC EVENT   Location : Home  Specific Details of Location: Bedroom    MECHANISM OF INJURY    Fall From standing      HISTORY:     This is a 89 y.o. female who presents to the Emergency Department via EMS where patient had a fall and struck her head on counter register on the wall.  Patient is alert and oriented x 1 at baseline.  She remembers that she fell but unable to tell us any information.  On examination, needs constant redirection that she is going to pull everything off.  In c-collar.  Has a very large scalp avulsion with deep laceration.  No arterial bleeding on initial evaluation.  Maintaining airway.  Moving extremities equally.  Good pulses throughout    Traumatic loss of Consciousness: No    Total Fluids Given Prior To Arrival none mL    MEDICATIONS:   []  None     []  Information not available due to exam limitations documented above    Prior to Admission medications    Not on File       ALLERGIES:   []  None    []   Information not available due to exam limitations documented above     Patient has no allergy information on record.    PAST MEDICAL/SURGICAL HISTORY: []  None   []

## 2025-01-21 NOTE — PROGRESS NOTES
Evaluation for Spine Clearance:    Pt is a 89 y.o. female who wasin the  ED s/p FALL.   Pt w/ complaints of HEADACHES.      C-Spine precautions of C-collar with spinal neutrality maintained since arrival with current exam directed at further evaluation of spine for clearance purposes.    Pt chart and current images reviewed.  CT C-Spine negative for acute fracture, subluxation, or traumatic injury.  Patient does not have a distracting injury, is not acutely intoxicated and is alert, oriented and fully able to participate in exam.      Pt denies c-spine pain while resting in c-collar.  C-collar removed w/ c-spine neutrality maintained.  Pt denies midline pain with palpation of spinous processes and axial loading.  Pt demonstrated full flexion, extension, and SB ROM without complaints of pain.        TLS precautions of supine position maintained since arrival.  Pt denies midline pain with palpation of spinous processes.  CT dorsal lumbar negative for acute fracture, subluxation, or traumatic injury.      C-spine is considered cleared w/out need for further imaging, evaluation, or continuation of c-collar.  TLS considered clear w/out need for further imagine, evaluation, or continuation of supine bedrest precautions.    Electronically signed by Colleen Mendoza MD on 1/21/2025 at 3:01 AM

## 2025-01-21 NOTE — DISCHARGE INSTRUCTIONS
-You were seen and evaluated by emergency medicine physicians at [facility].    -Please follow-up with your primary care physician and/or with the referrals to specialist.    -You were diagnosed with: Fall, scalp avulsion/laceration    -CT imaging was negative for any acute abnormality.  The lacerations to the scalp were closed with staples.  Please follow-up with your PCP in 1 week for reevaluation and staple removal.  You can also return to the emergency department for reevaluation and staple removal.  -Patient was given IV antibiotics here in the ED.  She will be discharged with a prescription of antibiotics for the next 7 days.  This is to prevent subsequent infection.  While the scalp does bleed quickly and typically prevents infection, the size of the laceration/avulsion increases likelihood of infection, which is why antibiotics have been prescribed.  Take as prescribed and to completion  -Please follow-up with your PCP.    -Follow the wound care instructions listed below:  Wound care instructions:  -Do not submerge the wound in water for a minimum of 2 weeks / 14 days.  This includes bath water, hot tubs, pools, lake water.  There is an increased risk for infection while the wound is healing if submerged.  -Dress the wound daily with clean gauze and bandage.  If the bandages become saturated, change more regularly.  -You can shower, allowing hot water and soap to run over the wound.  Pat dry after showering.  -Monitor for signs of infection including redness, swelling, purulent discharge, numbness, tingling as well as systemic signs such as fever, chills, nausea, vomiting      -Please return to the Emergency Department if you are experiencing the following symptoms acutely: Headache, fever, chills, nausea, vomiting, chest pain, shortness of breath, abdominal pain, change with urination, change with bowel movements, change in your skin/hair/nail, weakness, fatigue, altered mental status

## 2025-01-21 NOTE — ED NOTES
Pt son called out stating pt increasingly restless and attempting to take off head wrap, ccollar, and monitoring cords.   Son has been attempting to redirect pt without success.   Resident notified and at bedside for eval.

## 2025-01-21 NOTE — ED NOTES
Pt to ED 08.   Benjamin, son, at bedside and updated on plan of care.   Pt alert, oriented to self only.   Pt attempting to take off head wrap and ccollar but reoriented to situation.

## 2025-01-21 NOTE — PROGRESS NOTES
[facility]     [place] Note    PATIENT NAME: Mable Bai    Shift date: 1/20/2025  Shift day: Monday   Shift # 3    Room # TRAUMA B  Name: Mable Bai            Age: 89 y.o.  Gender: female          Mormon: Protestant   Place of Pentecostal: Unknown    Trauma/Incident type: Adult Trauma Priority  Admit Date & Time: 1/21/2025 12:17 AM  TRAUMA NAME: JACQUES    ADVANCE DIRECTIVES IN CHART?  No    NAME OF DECISION MAKER: Per next of kin hierarchy, patient's primary decision makers are her children, Alanna, Abril, and Benjamin, unless otherwise specified.     RELATIONSHIP OF DECISION MAKER TO PATIENT: Patient's children    PATIENT/EVENT DESCRIPTION:  Malbe Bai is a 89 y.o. female who arrived via [facility] to TRAUMA B and was paged out as an \"Adult Trauma Priority\" due to a \"Fall.\" Per report, patient resides with family and experienced a \"fall\" causing her to sustain a \"laceration to the head.\" Per report, patient arrived awake and talking, with history of \"dementia.\" Pt to be admitted to 08/08.      SPIRITUAL ASSESSMENT-INTERVENTION-OUTCOME:   responded to page and gathered patient information outside room.  learned that family was en route to the hospital and informed ED Triage of their expected arrival.  met with patient's son, Benjamin, and escorted him to bedside, per nurse approval. Patient remained \"confused\" and \"pulling at lines\" throughout encounter. Patient's son was coping well and indicated no needs at time of visit.     PATIENT BELONGINGS:  No belongings noted    ANY BELONGINGS OF SIGNIFICANT VALUE NOTED:  N/A    REGISTRATION STAFF NOTIFIED?  Yes      WHAT IS YOUR SPIRITUAL CARE PLAN FOR THIS PATIENT?:  Chaplains can make follow-up visit, per request. Chaplains can be reached 24/7 via [employer].    Electronically signed by Chaplain Keon, on 1/21/2025 at 7:14 AM.  [facility]

## 2025-01-21 NOTE — ED PROVIDER NOTES
[facility]   Emergency Department  Faculty Attestation       I performed a history and physical examination of the patient and discussed management with the resident. I reviewed the resident’s note and agree with the documented findings including all diagnostic interpretations and plan of care. Any areas of disagreement are noted on the chart. I was personally present for the key portions of any procedures. I have documented in the chart those procedures where I was not present during the key portions. I have reviewed the emergency nurses triage note. I agree with the chief complaint, past medical history, past surgical history, allergies, medications, social and family history as documented unless otherwise noted below.  For Physician Assistant/ Nurse Practitioner cases/documentation I have personally evaluated this patient and have completed at least one if not all key elements of the E/M (history, physical exam, and MDM). Additional findings are as noted.    Patient Name: Dv Balbir Ruano  MRN: [id number]  : 1880  Primary Care Physician: No primary care provider on file.    Date of evaluationa: 2025   Note Started: 12:36 AM EST    Pertinent Comments     Chief Complaint:   Chief Complaint   Patient presents with    Fall        Initial vitals: (If not listed, please see nursing documentation)  ED Triage Vitals   BP Systolic BP Percentile Diastolic BP Percentile Temp Temp Source Pulse Respirations SpO2   25 0025 -- -- 25 0035 25 0035 25 0025 25 0025 25 0025   (!) 163/88   97.6 °F (36.4 °C) Oral 75 20 93 %      Height Weight         -- --                        HPI/PE/Impression:  This is a 89 y.o. female who presents to the Emergency Department via EMS where patient had a fall and struck her head on counter register on the wall.  Patient is alert and oriented x 1 at baseline.  She remembers that she fell but unable to tell us any

## 2025-01-21 NOTE — PROCEDURES
PROCEDURE NOTE  Date: 1/21/2025   Name: Deejay Ruano  YOB: 1934    Procedures          PROCEDURE NOTE - LACERATION CLOSURE    PATIENT NAME: [place]v [place]  MEDICAL RECORD NO. [id number]  DATE: 1/21/2025  TIME OF CLOSURE: 2:30 PM  Laceration Repair Performed by: Colleen Mendoza MD    PREOPERATIVE DIAGNOSIS: Laceration(s) as follows:   LOCATION: SCALP   LENGTH: 16 CM   LAYERED CLOSURE: No    POSTOPERATIVE DIAGNOSIS:  Same  PROCEDURE PERFORMED:  Suture closure of laceration  ESTIMATED BLOOD LOSS:  Less than 25 ml.  COMPLICATIONS:  None immediately appreciated.     DISCUSSION:  Deejay Ruano is a 89 y.o.-year-old female. The history and physical examination were reviewed and confirmed.  The diagnoses, proposed procedure, risks, possible complications, benefits and alternatives were discussed with the patient or family. She was given the opportunity to ask questions, and once answered, informed consent was obtained.  The patient was then prepared for the procedure.    Consent: Unable to be obtained due to patient's condition.    Time out performed: Immediately prior to the procedure a \"time out\" was called to verify the correct patient, the correct procedure, equipment, support staff and site/side marked as required.      Procedure: The patient was placed in the appropriate position and anesthesia around the laceration was obtained by infiltration using 1% Lidocaine without epinephrine. The area was then cleansed with betadine and draped in a sterile fashion. The laceration was closed with staples. There were no additional lacerations requiring repair. The wound area was then dressed with gauze.      Total repaired wound length: 16 cm.     Other Items: Staple count: 25    The patient tolerated the procedure with difficulty.    Complications: NONE    Colleen Mendoza MD  1/21/25, 2:17 AM